# Patient Record
Sex: MALE | Race: WHITE | NOT HISPANIC OR LATINO | Employment: OTHER | URBAN - METROPOLITAN AREA
[De-identification: names, ages, dates, MRNs, and addresses within clinical notes are randomized per-mention and may not be internally consistent; named-entity substitution may affect disease eponyms.]

---

## 2023-01-01 ENCOUNTER — MEDICAL CORRESPONDENCE (OUTPATIENT)
Dept: HEALTH INFORMATION MANAGEMENT | Facility: CLINIC | Age: 78
End: 2023-01-01

## 2023-01-01 ENCOUNTER — APPOINTMENT (OUTPATIENT)
Dept: GENERAL RADIOLOGY | Facility: CLINIC | Age: 78
DRG: 871 | End: 2023-01-01
Attending: EMERGENCY MEDICINE
Payer: MEDICARE

## 2023-01-01 ENCOUNTER — LAB REQUISITION (OUTPATIENT)
Dept: LAB | Facility: CLINIC | Age: 78
End: 2023-01-01
Payer: MEDICARE

## 2023-01-01 ENCOUNTER — HOSPITAL ENCOUNTER (INPATIENT)
Facility: CLINIC | Age: 78
LOS: 2 days | DRG: 871 | End: 2023-12-05
Attending: EMERGENCY MEDICINE | Admitting: HOSPITALIST
Payer: MEDICARE

## 2023-01-01 ENCOUNTER — APPOINTMENT (OUTPATIENT)
Dept: GENERAL RADIOLOGY | Facility: CLINIC | Age: 78
End: 2023-01-01
Payer: MEDICARE

## 2023-01-01 ENCOUNTER — DOCUMENTATION ONLY (OUTPATIENT)
Dept: OTHER | Facility: CLINIC | Age: 78
End: 2023-01-01
Payer: MEDICARE

## 2023-01-01 ENCOUNTER — HOSPITAL ENCOUNTER (OUTPATIENT)
Facility: CLINIC | Age: 78
Setting detail: OBSERVATION
Discharge: HOME | End: 2023-11-30
Attending: EMERGENCY MEDICINE | Admitting: HOSPITALIST
Payer: MEDICARE

## 2023-01-01 ENCOUNTER — DOCUMENTATION ONLY (OUTPATIENT)
Dept: CARE COORDINATION | Facility: CLINIC | Age: 78
End: 2023-01-01
Payer: MEDICARE

## 2023-01-01 ENCOUNTER — APPOINTMENT (OUTPATIENT)
Dept: CT IMAGING | Facility: CLINIC | Age: 78
End: 2023-01-01
Payer: MEDICARE

## 2023-01-01 ENCOUNTER — TRANSFERRED RECORDS (OUTPATIENT)
Dept: HEALTH INFORMATION MANAGEMENT | Facility: CLINIC | Age: 78
End: 2023-01-01

## 2023-01-01 ENCOUNTER — APPOINTMENT (OUTPATIENT)
Dept: CT IMAGING | Facility: CLINIC | Age: 78
DRG: 871 | End: 2023-01-01
Attending: EMERGENCY MEDICINE
Payer: MEDICARE

## 2023-01-01 ENCOUNTER — APPOINTMENT (OUTPATIENT)
Dept: CARDIOLOGY | Facility: CLINIC | Age: 78
DRG: 871 | End: 2023-01-01
Attending: PHYSICIAN ASSISTANT
Payer: MEDICARE

## 2023-01-01 VITALS
HEART RATE: 81 BPM | RESPIRATION RATE: 21 BRPM | BODY MASS INDEX: 25.77 KG/M2 | DIASTOLIC BLOOD PRESSURE: 91 MMHG | OXYGEN SATURATION: 97 % | SYSTOLIC BLOOD PRESSURE: 115 MMHG | TEMPERATURE: 97.8 F | WEIGHT: 180 LBS | HEIGHT: 70 IN

## 2023-01-01 VITALS
HEART RATE: 92 BPM | HEIGHT: 70 IN | TEMPERATURE: 97.5 F | SYSTOLIC BLOOD PRESSURE: 115 MMHG | OXYGEN SATURATION: 98 % | BODY MASS INDEX: 25.77 KG/M2 | RESPIRATION RATE: 22 BRPM | WEIGHT: 180 LBS | DIASTOLIC BLOOD PRESSURE: 58 MMHG

## 2023-01-01 DIAGNOSIS — K59.00 CONSTIPATION, UNSPECIFIED CONSTIPATION TYPE: ICD-10-CM

## 2023-01-01 DIAGNOSIS — R79.89 ELEVATED TROPONIN: ICD-10-CM

## 2023-01-01 DIAGNOSIS — R53.1 GENERALIZED WEAKNESS: Primary | ICD-10-CM

## 2023-01-01 DIAGNOSIS — N39.0 URINARY TRACT INFECTION WITHOUT HEMATURIA, SITE UNSPECIFIED: ICD-10-CM

## 2023-01-01 DIAGNOSIS — R19.5 GUAIAC POSITIVE STOOLS: ICD-10-CM

## 2023-01-01 DIAGNOSIS — D64.9 ANEMIA: ICD-10-CM

## 2023-01-01 DIAGNOSIS — A41.9 SEPSIS, DUE TO UNSPECIFIED ORGANISM, UNSPECIFIED WHETHER ACUTE ORGAN DYSFUNCTION PRESENT (H): ICD-10-CM

## 2023-01-01 DIAGNOSIS — R35.0 FREQUENCY OF MICTURITION: ICD-10-CM

## 2023-01-01 DIAGNOSIS — D64.9 ANEMIA, UNSPECIFIED: ICD-10-CM

## 2023-01-01 LAB
ABO/RH(D): NORMAL
ALBUMIN SERPL BCG-MCNC: 2.4 G/DL (ref 3.5–5.2)
ALBUMIN SERPL BCG-MCNC: 2.4 G/DL (ref 3.5–5.2)
ALBUMIN SERPL BCG-MCNC: 2.5 G/DL (ref 3.5–5.2)
ALBUMIN SERPL BCG-MCNC: 3.2 G/DL (ref 3.5–5.2)
ALBUMIN SERPL BCG-MCNC: 3.3 G/DL (ref 3.5–5.2)
ALBUMIN UR-MCNC: 100 MG/DL
ALBUMIN UR-MCNC: 70 MG/DL
ALBUMIN UR-MCNC: 70 MG/DL
ALLEN'S TEST: ABNORMAL
ALLEN'S TEST: YES
ALP SERPL-CCNC: 64 U/L (ref 40–150)
ALP SERPL-CCNC: 69 U/L (ref 40–150)
ALP SERPL-CCNC: 83 U/L (ref 40–150)
ALP SERPL-CCNC: 89 U/L (ref 40–150)
ALT SERPL W P-5'-P-CCNC: 1133 U/L (ref 0–70)
ALT SERPL W P-5'-P-CCNC: 22 U/L (ref 0–70)
ALT SERPL W P-5'-P-CCNC: 29 U/L (ref 0–70)
ALT SERPL W P-5'-P-CCNC: 39 U/L (ref 0–70)
ANION GAP SERPL CALCULATED.3IONS-SCNC: 11 MMOL/L (ref 7–15)
ANION GAP SERPL CALCULATED.3IONS-SCNC: 12 MMOL/L (ref 7–15)
ANION GAP SERPL CALCULATED.3IONS-SCNC: 13 MMOL/L (ref 7–15)
ANION GAP SERPL CALCULATED.3IONS-SCNC: 14 MMOL/L (ref 7–15)
ANION GAP SERPL CALCULATED.3IONS-SCNC: 23 MMOL/L (ref 7–15)
ANION GAP SERPL CALCULATED.3IONS-SCNC: 25 MMOL/L (ref 7–15)
ANION GAP SERPL CALCULATED.3IONS-SCNC: 8 MMOL/L (ref 7–15)
ANTIBODY SCREEN: NEGATIVE
APPEARANCE UR: ABNORMAL
APPEARANCE UR: ABNORMAL
APPEARANCE UR: CLEAR
AST SERPL W P-5'-P-CCNC: 20 U/L (ref 0–45)
AST SERPL W P-5'-P-CCNC: 21 U/L (ref 0–45)
AST SERPL W P-5'-P-CCNC: 25 U/L (ref 0–45)
AST SERPL W P-5'-P-CCNC: 2581 U/L (ref 0–45)
ATRIAL RATE - MUSE: 119 BPM
ATRIAL RATE - MUSE: 70 BPM
BACTERIA #/AREA URNS HPF: ABNORMAL /HPF
BACTERIA BLD CULT: ABNORMAL
BACTERIA BLD CULT: ABNORMAL
BACTERIA BLD CULT: NO GROWTH
BACTERIA UR CULT: NORMAL
BASE EXCESS BLDA CALC-SCNC: -12 MMOL/L (ref -9–1.8)
BASE EXCESS BLDA CALC-SCNC: -15.4 MMOL/L (ref -9–1.8)
BASE EXCESS BLDA CALC-SCNC: -17.2 MMOL/L (ref -9–1.8)
BASE EXCESS BLDA CALC-SCNC: -4.5 MMOL/L (ref -9–1.8)
BASE EXCESS BLDA CALC-SCNC: -7.5 MMOL/L (ref -9–1.8)
BASE EXCESS BLDV CALC-SCNC: -8.4 MMOL/L (ref -7.7–1.9)
BASOPHILS # BLD AUTO: 0 10E3/UL (ref 0–0.2)
BASOPHILS # BLD AUTO: 0 10E3/UL (ref 0–0.2)
BASOPHILS NFR BLD AUTO: 0 %
BASOPHILS NFR BLD AUTO: 0 %
BILIRUB DIRECT SERPL-MCNC: 0.53 MG/DL (ref 0–0.3)
BILIRUB DIRECT SERPL-MCNC: <0.2 MG/DL (ref 0–0.3)
BILIRUB SERPL-MCNC: 0.4 MG/DL
BILIRUB SERPL-MCNC: 0.4 MG/DL
BILIRUB SERPL-MCNC: 0.5 MG/DL
BILIRUB SERPL-MCNC: 0.7 MG/DL
BILIRUB UR QL STRIP: NEGATIVE
BUN SERPL-MCNC: 20.6 MG/DL (ref 8–23)
BUN SERPL-MCNC: 25.9 MG/DL (ref 8–23)
BUN SERPL-MCNC: 26.3 MG/DL (ref 8–23)
BUN SERPL-MCNC: 26.6 MG/DL (ref 8–23)
BUN SERPL-MCNC: 30 MG/DL (ref 8–23)
BUN SERPL-MCNC: 42.4 MG/DL (ref 8–23)
BUN SERPL-MCNC: 44.2 MG/DL (ref 8–23)
CA-I BLD-MCNC: 4.3 MG/DL (ref 4.4–5.2)
CALCIUM SERPL-MCNC: 7.5 MG/DL (ref 8.8–10.2)
CALCIUM SERPL-MCNC: 7.7 MG/DL (ref 8.8–10.2)
CALCIUM SERPL-MCNC: 7.9 MG/DL (ref 8.8–10.2)
CALCIUM SERPL-MCNC: 8.5 MG/DL (ref 8.8–10.2)
CALCIUM SERPL-MCNC: 8.6 MG/DL (ref 8.8–10.2)
CALCIUM SERPL-MCNC: 9 MG/DL (ref 8.8–10.2)
CALCIUM SERPL-MCNC: 9.8 MG/DL (ref 8.8–10.2)
CAOX CRY #/AREA URNS HPF: ABNORMAL /HPF
CHLORIDE SERPL-SCNC: 104 MMOL/L (ref 98–107)
CHLORIDE SERPL-SCNC: 105 MMOL/L (ref 98–107)
CHLORIDE SERPL-SCNC: 109 MMOL/L (ref 98–107)
CHLORIDE SERPL-SCNC: 110 MMOL/L (ref 98–107)
CHLORIDE SERPL-SCNC: 112 MMOL/L (ref 98–107)
CHLORIDE SERPL-SCNC: 115 MMOL/L (ref 98–107)
CHLORIDE SERPL-SCNC: 116 MMOL/L (ref 98–107)
CK SERPL-CCNC: 140 U/L (ref 39–308)
COLOR UR AUTO: ABNORMAL
COLOR UR AUTO: ABNORMAL
COLOR UR AUTO: YELLOW
CREAT SERPL-MCNC: 0.81 MG/DL (ref 0.67–1.17)
CREAT SERPL-MCNC: 0.83 MG/DL (ref 0.67–1.17)
CREAT SERPL-MCNC: 1.04 MG/DL (ref 0.67–1.17)
CREAT SERPL-MCNC: 1.38 MG/DL (ref 0.67–1.17)
CREAT SERPL-MCNC: 1.59 MG/DL (ref 0.67–1.17)
CREAT SERPL-MCNC: 2.21 MG/DL (ref 0.67–1.17)
CREAT SERPL-MCNC: 2.23 MG/DL (ref 0.67–1.17)
DEPRECATED HCO3 PLAS-SCNC: 16 MMOL/L (ref 22–29)
DEPRECATED HCO3 PLAS-SCNC: 17 MMOL/L (ref 22–29)
DEPRECATED HCO3 PLAS-SCNC: 24 MMOL/L (ref 22–29)
DEPRECATED HCO3 PLAS-SCNC: 7 MMOL/L (ref 22–29)
DEPRECATED HCO3 PLAS-SCNC: 9 MMOL/L (ref 22–29)
DIASTOLIC BLOOD PRESSURE - MUSE: NORMAL MMHG
DIASTOLIC BLOOD PRESSURE - MUSE: NORMAL MMHG
EGFRCR SERPLBLD CKD-EPI 2021: 30 ML/MIN/1.73M2
EGFRCR SERPLBLD CKD-EPI 2021: 30 ML/MIN/1.73M2
EGFRCR SERPLBLD CKD-EPI 2021: 44 ML/MIN/1.73M2
EGFRCR SERPLBLD CKD-EPI 2021: 53 ML/MIN/1.73M2
EGFRCR SERPLBLD CKD-EPI 2021: 74 ML/MIN/1.73M2
EGFRCR SERPLBLD CKD-EPI 2021: 90 ML/MIN/1.73M2
EGFRCR SERPLBLD CKD-EPI 2021: >90 ML/MIN/1.73M2
ENTEROCOCCUS FAECALIS: NOT DETECTED
ENTEROCOCCUS FAECALIS: NOT DETECTED
ENTEROCOCCUS FAECIUM: NOT DETECTED
ENTEROCOCCUS FAECIUM: NOT DETECTED
EOSINOPHIL # BLD AUTO: 0 10E3/UL (ref 0–0.7)
EOSINOPHIL # BLD AUTO: 0 10E3/UL (ref 0–0.7)
EOSINOPHIL NFR BLD AUTO: 0 %
EOSINOPHIL NFR BLD AUTO: 0 %
ERYTHROCYTE [DISTWIDTH] IN BLOOD BY AUTOMATED COUNT: 14.3 % (ref 10–15)
ERYTHROCYTE [DISTWIDTH] IN BLOOD BY AUTOMATED COUNT: 14.5 % (ref 10–15)
ERYTHROCYTE [DISTWIDTH] IN BLOOD BY AUTOMATED COUNT: 14.6 % (ref 10–15)
ERYTHROCYTE [DISTWIDTH] IN BLOOD BY AUTOMATED COUNT: 14.7 % (ref 10–15)
ERYTHROCYTE [DISTWIDTH] IN BLOOD BY AUTOMATED COUNT: 15.1 % (ref 10–15)
ERYTHROCYTE [DISTWIDTH] IN BLOOD BY AUTOMATED COUNT: 15.4 % (ref 10–15)
FLUAV RNA SPEC QL NAA+PROBE: NEGATIVE
FLUAV RNA SPEC QL NAA+PROBE: NEGATIVE
FLUBV RNA RESP QL NAA+PROBE: NEGATIVE
FLUBV RNA RESP QL NAA+PROBE: NEGATIVE
GLUCOSE BLDC GLUCOMTR-MCNC: 102 MG/DL (ref 70–99)
GLUCOSE BLDC GLUCOMTR-MCNC: 106 MG/DL (ref 70–99)
GLUCOSE BLDC GLUCOMTR-MCNC: 111 MG/DL (ref 70–99)
GLUCOSE BLDC GLUCOMTR-MCNC: 113 MG/DL (ref 70–99)
GLUCOSE BLDC GLUCOMTR-MCNC: 124 MG/DL (ref 70–99)
GLUCOSE BLDC GLUCOMTR-MCNC: 127 MG/DL (ref 70–99)
GLUCOSE BLDC GLUCOMTR-MCNC: 128 MG/DL (ref 70–99)
GLUCOSE BLDC GLUCOMTR-MCNC: 141 MG/DL (ref 70–99)
GLUCOSE BLDC GLUCOMTR-MCNC: 171 MG/DL (ref 70–99)
GLUCOSE BLDC GLUCOMTR-MCNC: 176 MG/DL (ref 70–99)
GLUCOSE BLDC GLUCOMTR-MCNC: 213 MG/DL (ref 70–99)
GLUCOSE SERPL-MCNC: 102 MG/DL (ref 70–99)
GLUCOSE SERPL-MCNC: 103 MG/DL (ref 70–99)
GLUCOSE SERPL-MCNC: 112 MG/DL (ref 70–99)
GLUCOSE SERPL-MCNC: 154 MG/DL (ref 70–99)
GLUCOSE SERPL-MCNC: 183 MG/DL (ref 70–99)
GLUCOSE SERPL-MCNC: 209 MG/DL (ref 70–99)
GLUCOSE SERPL-MCNC: 91 MG/DL (ref 70–99)
GLUCOSE UR STRIP-MCNC: 150 MG/DL
GLUCOSE UR STRIP-MCNC: NEGATIVE MG/DL
GLUCOSE UR STRIP-MCNC: NEGATIVE MG/DL
GRANULAR CAST: 3 /LPF
HBA1C MFR BLD: 6.6 %
HCO3 BLD-SCNC: 11 MMOL/L (ref 21–28)
HCO3 BLD-SCNC: 14 MMOL/L (ref 21–28)
HCO3 BLD-SCNC: 17 MMOL/L (ref 21–28)
HCO3 BLD-SCNC: 20 MMOL/L (ref 21–28)
HCO3 BLD-SCNC: 9 MMOL/L (ref 21–28)
HCO3 BLDV-SCNC: 19 MMOL/L (ref 21–28)
HCT VFR BLD AUTO: 21.6 % (ref 40–53)
HCT VFR BLD AUTO: 25.2 % (ref 40–53)
HCT VFR BLD AUTO: 25.7 % (ref 40–53)
HCT VFR BLD AUTO: 29.9 % (ref 40–53)
HCT VFR BLD AUTO: 30.6 % (ref 40–53)
HCT VFR BLD AUTO: 30.6 % (ref 40–53)
HEMOCCULT STL QL: POSITIVE
HGB BLD-MCNC: 6.5 G/DL (ref 13.3–17.7)
HGB BLD-MCNC: 6.5 G/DL (ref 13.3–17.7)
HGB BLD-MCNC: 7.8 G/DL (ref 13.3–17.7)
HGB BLD-MCNC: 7.8 G/DL (ref 13.3–17.7)
HGB BLD-MCNC: 9.3 G/DL (ref 13.3–17.7)
HGB BLD-MCNC: 9.4 G/DL (ref 13.3–17.7)
HGB BLD-MCNC: 9.6 G/DL (ref 13.3–17.7)
HGB BLD-MCNC: 9.8 G/DL (ref 13.3–17.7)
HGB UR QL STRIP: ABNORMAL
HOLD SPECIMEN: NORMAL
IMM GRANULOCYTES # BLD: 0.1 10E3/UL
IMM GRANULOCYTES # BLD: 0.1 10E3/UL
IMM GRANULOCYTES NFR BLD: 1 %
IMM GRANULOCYTES NFR BLD: 1 %
INR PPP: 2.01 (ref 0.85–1.15)
INTERPRETATION ECG - MUSE: NORMAL
INTERPRETATION ECG - MUSE: NORMAL
KETONES UR STRIP-MCNC: NEGATIVE MG/DL
LACTATE SERPL-SCNC: 1.4 MMOL/L (ref 0.7–2)
LACTATE SERPL-SCNC: 1.6 MMOL/L (ref 0.7–2)
LACTATE SERPL-SCNC: 1.8 MMOL/L (ref 0.7–2)
LACTATE SERPL-SCNC: 2.5 MMOL/L (ref 0.7–2)
LACTATE SERPL-SCNC: 2.9 MMOL/L (ref 0.7–2)
LACTATE SERPL-SCNC: 3.9 MMOL/L (ref 0.7–2)
LACTATE SERPL-SCNC: 4.2 MMOL/L (ref 0.7–2)
LEUKOCYTE ESTERASE UR QL STRIP: ABNORMAL
LEUKOCYTE ESTERASE UR QL STRIP: NEGATIVE
LEUKOCYTE ESTERASE UR QL STRIP: NEGATIVE
LISTERIA SPECIES (DETECTED/NOT DETECTED): NOT DETECTED
LISTERIA SPECIES (DETECTED/NOT DETECTED): NOT DETECTED
LVEF ECHO: NORMAL
LYMPHOCYTES # BLD AUTO: 0.3 10E3/UL (ref 0.8–5.3)
LYMPHOCYTES # BLD AUTO: 0.7 10E3/UL (ref 0.8–5.3)
LYMPHOCYTES NFR BLD AUTO: 2 %
LYMPHOCYTES NFR BLD AUTO: 6 %
MAGNESIUM SERPL-MCNC: 1.8 MG/DL (ref 1.7–2.3)
MAGNESIUM SERPL-MCNC: 1.9 MG/DL (ref 1.7–2.3)
MAGNESIUM SERPL-MCNC: 1.9 MG/DL (ref 1.7–2.3)
MCH RBC QN AUTO: 30.8 PG (ref 26.5–33)
MCH RBC QN AUTO: 31 PG (ref 26.5–33)
MCH RBC QN AUTO: 31.1 PG (ref 26.5–33)
MCH RBC QN AUTO: 31.3 PG (ref 26.5–33)
MCH RBC QN AUTO: 31.3 PG (ref 26.5–33)
MCH RBC QN AUTO: 31.6 PG (ref 26.5–33)
MCHC RBC AUTO-ENTMCNC: 30.1 G/DL (ref 31.5–36.5)
MCHC RBC AUTO-ENTMCNC: 30.4 G/DL (ref 31.5–36.5)
MCHC RBC AUTO-ENTMCNC: 30.7 G/DL (ref 31.5–36.5)
MCHC RBC AUTO-ENTMCNC: 31 G/DL (ref 31.5–36.5)
MCHC RBC AUTO-ENTMCNC: 31.1 G/DL (ref 31.5–36.5)
MCHC RBC AUTO-ENTMCNC: 31.4 G/DL (ref 31.5–36.5)
MCV RBC AUTO: 100 FL (ref 78–100)
MCV RBC AUTO: 101 FL (ref 78–100)
MCV RBC AUTO: 103 FL (ref 78–100)
MCV RBC AUTO: 104 FL (ref 78–100)
MONOCYTES # BLD AUTO: 0.5 10E3/UL (ref 0–1.3)
MONOCYTES # BLD AUTO: 0.6 10E3/UL (ref 0–1.3)
MONOCYTES NFR BLD AUTO: 4 %
MONOCYTES NFR BLD AUTO: 5 %
MUCOUS THREADS #/AREA URNS LPF: PRESENT /LPF
MUCOUS THREADS #/AREA URNS LPF: PRESENT /LPF
NEUTROPHILS # BLD AUTO: 11.7 10E3/UL (ref 1.6–8.3)
NEUTROPHILS # BLD AUTO: 9.3 10E3/UL (ref 1.6–8.3)
NEUTROPHILS NFR BLD AUTO: 89 %
NEUTROPHILS NFR BLD AUTO: 92 %
NITRATE UR QL: NEGATIVE
NITRATE UR QL: NEGATIVE
NITRATE UR QL: POSITIVE
NRBC # BLD AUTO: 0 10E3/UL
NRBC # BLD AUTO: 0 10E3/UL
NRBC BLD AUTO-RTO: 0 /100
NRBC BLD AUTO-RTO: 0 /100
NT-PROBNP SERPL-MCNC: 796 PG/ML (ref 0–1800)
O2/TOTAL GAS SETTING VFR VENT: 36 %
O2/TOTAL GAS SETTING VFR VENT: 44 %
O2/TOTAL GAS SETTING VFR VENT: 5 %
O2/TOTAL GAS SETTING VFR VENT: 50 %
OXYHGB MFR BLD: 98 % (ref 92–100)
P AXIS - MUSE: -5 DEGREES
P AXIS - MUSE: NORMAL DEGREES
PCO2 BLD: 23 MM HG (ref 35–45)
PCO2 BLD: 24 MM HG (ref 35–45)
PCO2 BLD: 29 MM HG (ref 35–45)
PCO2 BLD: 31 MM HG (ref 35–45)
PCO2 BLD: 32 MM HG (ref 35–45)
PCO2 BLDV: 51 MM HG (ref 40–50)
PH BLD: 7.21 [PH] (ref 7.35–7.45)
PH BLD: 7.25 [PH] (ref 7.35–7.45)
PH BLD: 7.27 [PH] (ref 7.35–7.45)
PH BLD: 7.35 [PH] (ref 7.35–7.45)
PH BLD: 7.4 [PH] (ref 7.35–7.45)
PH BLDV: 7.19 [PH] (ref 7.32–7.43)
PH UR STRIP: 5.5 [PH] (ref 5–7)
PH UR STRIP: 6 [PH] (ref 5–7)
PH UR STRIP: 6.5 [PH] (ref 5–7)
PHOSPHATE SERPL-MCNC: 2.4 MG/DL (ref 2.5–4.5)
PHOSPHATE SERPL-MCNC: 2.6 MG/DL (ref 2.5–4.5)
PHOSPHATE SERPL-MCNC: 6.8 MG/DL (ref 2.5–4.5)
PHOSPHATE SERPL-MCNC: 7.1 MG/DL (ref 2.5–4.5)
PLATELET # BLD AUTO: 146 10E3/UL (ref 150–450)
PLATELET # BLD AUTO: 147 10E3/UL (ref 150–450)
PLATELET # BLD AUTO: 178 10E3/UL (ref 150–450)
PLATELET # BLD AUTO: 186 10E3/UL (ref 150–450)
PLATELET # BLD AUTO: 186 10E3/UL (ref 150–450)
PLATELET # BLD AUTO: 188 10E3/UL (ref 150–450)
PO2 BLD: 115 MM HG (ref 80–105)
PO2 BLD: 137 MM HG (ref 80–105)
PO2 BLD: 163 MM HG (ref 80–105)
PO2 BLD: 175 MM HG (ref 80–105)
PO2 BLD: 79 MM HG (ref 80–105)
PO2 BLDV: 24 MM HG (ref 25–47)
POTASSIUM SERPL-SCNC: 2.8 MMOL/L (ref 3.4–5.3)
POTASSIUM SERPL-SCNC: 3.1 MMOL/L (ref 3.4–5.3)
POTASSIUM SERPL-SCNC: 3.8 MMOL/L (ref 3.4–5.3)
POTASSIUM SERPL-SCNC: 3.9 MMOL/L (ref 3.4–5.3)
POTASSIUM SERPL-SCNC: 4.2 MMOL/L (ref 3.4–5.3)
POTASSIUM SERPL-SCNC: 5.2 MMOL/L (ref 3.4–5.3)
POTASSIUM SERPL-SCNC: 5.6 MMOL/L (ref 3.4–5.3)
POTASSIUM SERPL-SCNC: 6 MMOL/L (ref 3.4–5.3)
PR INTERVAL - MUSE: 178 MS
PR INTERVAL - MUSE: NORMAL MS
PROCALCITONIN SERPL IA-MCNC: 17.52 NG/ML
PROT SERPL-MCNC: 5 G/DL (ref 6.4–8.3)
PROT SERPL-MCNC: 5.2 G/DL (ref 6.4–8.3)
PROT SERPL-MCNC: 5.7 G/DL (ref 6.4–8.3)
PROT SERPL-MCNC: 6.4 G/DL (ref 6.4–8.3)
QRS DURATION - MUSE: 124 MS
QRS DURATION - MUSE: 96 MS
QT - MUSE: 440 MS
QT - MUSE: 450 MS
QTC - MUSE: 486 MS
QTC - MUSE: 523 MS
R AXIS - MUSE: -22 DEGREES
R AXIS - MUSE: -29 DEGREES
RBC # BLD AUTO: 2.1 10E6/UL (ref 4.4–5.9)
RBC # BLD AUTO: 2.47 10E6/UL (ref 4.4–5.9)
RBC # BLD AUTO: 2.51 10E6/UL (ref 4.4–5.9)
RBC # BLD AUTO: 2.97 10E6/UL (ref 4.4–5.9)
RBC # BLD AUTO: 3.05 10E6/UL (ref 4.4–5.9)
RBC # BLD AUTO: 3.07 10E6/UL (ref 4.4–5.9)
RBC URINE: 1 /HPF
RBC URINE: 10 /HPF
RBC URINE: 16 /HPF
RSV RNA SPEC NAA+PROBE: NEGATIVE
RSV RNA SPEC NAA+PROBE: NEGATIVE
SARS-COV-2 RNA RESP QL NAA+PROBE: NEGATIVE
SARS-COV-2 RNA RESP QL NAA+PROBE: NEGATIVE
SODIUM SERPL-SCNC: 140 MMOL/L (ref 135–145)
SODIUM SERPL-SCNC: 140 MMOL/L (ref 135–145)
SODIUM SERPL-SCNC: 141 MMOL/L (ref 135–145)
SODIUM SERPL-SCNC: 143 MMOL/L (ref 135–145)
SODIUM SERPL-SCNC: 144 MMOL/L (ref 135–145)
SODIUM SERPL-SCNC: 144 MMOL/L (ref 135–145)
SODIUM SERPL-SCNC: 146 MMOL/L (ref 135–145)
SP GR UR STRIP: 1.01 (ref 1–1.03)
SP GR UR STRIP: 1.02 (ref 1–1.03)
SP GR UR STRIP: 1.02 (ref 1–1.03)
SPECIMEN EXPIRATION DATE: NORMAL
STAPHYLOCOCCUS AUREUS: NOT DETECTED
STAPHYLOCOCCUS AUREUS: NOT DETECTED
STAPHYLOCOCCUS EPIDERMIDIS: NOT DETECTED
STAPHYLOCOCCUS EPIDERMIDIS: NOT DETECTED
STAPHYLOCOCCUS LUGDUNENSIS: NOT DETECTED
STAPHYLOCOCCUS LUGDUNENSIS: NOT DETECTED
STAPHYLOCOCCUS SPECIES: DETECTED
STAPHYLOCOCCUS SPECIES: NOT DETECTED
STREPTOCOCCUS AGALACTIAE: NOT DETECTED
STREPTOCOCCUS AGALACTIAE: NOT DETECTED
STREPTOCOCCUS ANGINOSUS GROUP: NOT DETECTED
STREPTOCOCCUS ANGINOSUS GROUP: NOT DETECTED
STREPTOCOCCUS PNEUMONIAE: NOT DETECTED
STREPTOCOCCUS PNEUMONIAE: NOT DETECTED
STREPTOCOCCUS PYOGENES: NOT DETECTED
STREPTOCOCCUS PYOGENES: NOT DETECTED
STREPTOCOCCUS SPECIES: DETECTED
STREPTOCOCCUS SPECIES: NOT DETECTED
SYSTOLIC BLOOD PRESSURE - MUSE: NORMAL MMHG
SYSTOLIC BLOOD PRESSURE - MUSE: NORMAL MMHG
T AXIS - MUSE: 56 DEGREES
T AXIS - MUSE: 94 DEGREES
TROPONIN T SERPL HS-MCNC: 150 NG/L
TROPONIN T SERPL HS-MCNC: 191 NG/L
TROPONIN T SERPL HS-MCNC: 202 NG/L
TROPONIN T SERPL HS-MCNC: 261 NG/L
UROBILINOGEN UR STRIP-MCNC: NORMAL MG/DL
VANCOMYCIN SERPL-MCNC: 24.1 UG/ML
VANCOMYCIN SERPL-MCNC: 25.7 UG/ML
VENTRICULAR RATE- MUSE: 70 BPM
VENTRICULAR RATE- MUSE: 85 BPM
WBC # BLD AUTO: 10.5 10E3/UL (ref 4–11)
WBC # BLD AUTO: 12 10E3/UL (ref 4–11)
WBC # BLD AUTO: 12.4 10E3/UL (ref 4–11)
WBC # BLD AUTO: 12.8 10E3/UL (ref 4–11)
WBC # BLD AUTO: 9.2 10E3/UL (ref 4–11)
WBC # BLD AUTO: 9.5 10E3/UL (ref 4–11)
WBC URINE: 1 /HPF
WBC URINE: 55 /HPF
WBC URINE: <1 /HPF

## 2023-01-01 PROCEDURE — 87086 URINE CULTURE/COLONY COUNT: CPT | Mod: ORL

## 2023-01-01 PROCEDURE — 84450 TRANSFERASE (AST) (SGOT): CPT | Performed by: INTERNAL MEDICINE

## 2023-01-01 PROCEDURE — 83735 ASSAY OF MAGNESIUM: CPT | Performed by: PHYSICIAN ASSISTANT

## 2023-01-01 PROCEDURE — 71046 X-RAY EXAM CHEST 2 VIEWS: CPT

## 2023-01-01 PROCEDURE — 99207 PR NO BILLABLE SERVICE THIS VISIT: CPT | Performed by: INTERNAL MEDICINE

## 2023-01-01 PROCEDURE — 258N000003 HC RX IP 258 OP 636

## 2023-01-01 PROCEDURE — 999N000157 HC STATISTIC RCP TIME EA 10 MIN

## 2023-01-01 PROCEDURE — 36600 WITHDRAWAL OF ARTERIAL BLOOD: CPT

## 2023-01-01 PROCEDURE — 80202 ASSAY OF VANCOMYCIN: CPT | Performed by: HOSPITALIST

## 2023-01-01 PROCEDURE — 81001 URINALYSIS AUTO W/SCOPE: CPT | Mod: ORL

## 2023-01-01 PROCEDURE — 83735 ASSAY OF MAGNESIUM: CPT | Performed by: HOSPITALIST

## 2023-01-01 PROCEDURE — 250N000009 HC RX 250: Performed by: INTERNAL MEDICINE

## 2023-01-01 PROCEDURE — G0378 HOSPITAL OBSERVATION PER HR: HCPCS

## 2023-01-01 PROCEDURE — 99233 SBSQ HOSP IP/OBS HIGH 50: CPT | Mod: 25 | Performed by: SURGERY

## 2023-01-01 PROCEDURE — 85610 PROTHROMBIN TIME: CPT | Performed by: SURGERY

## 2023-01-01 PROCEDURE — 84132 ASSAY OF SERUM POTASSIUM: CPT | Performed by: INTERNAL MEDICINE

## 2023-01-01 PROCEDURE — 36415 COLL VENOUS BLD VENIPUNCTURE: CPT | Performed by: EMERGENCY MEDICINE

## 2023-01-01 PROCEDURE — 82248 BILIRUBIN DIRECT: CPT | Performed by: STUDENT IN AN ORGANIZED HEALTH CARE EDUCATION/TRAINING PROGRAM

## 2023-01-01 PROCEDURE — 82803 BLOOD GASES ANY COMBINATION: CPT | Performed by: INTERNAL MEDICINE

## 2023-01-01 PROCEDURE — 84100 ASSAY OF PHOSPHORUS: CPT | Performed by: SURGERY

## 2023-01-01 PROCEDURE — 99291 CRITICAL CARE FIRST HOUR: CPT

## 2023-01-01 PROCEDURE — 250N000013 HC RX MED GY IP 250 OP 250 PS 637: Performed by: STUDENT IN AN ORGANIZED HEALTH CARE EDUCATION/TRAINING PROGRAM

## 2023-01-01 PROCEDURE — 84155 ASSAY OF PROTEIN SERUM: CPT | Performed by: INTERNAL MEDICINE

## 2023-01-01 PROCEDURE — 83880 ASSAY OF NATRIURETIC PEPTIDE: CPT

## 2023-01-01 PROCEDURE — 250N000011 HC RX IP 250 OP 636: Mod: JZ | Performed by: EMERGENCY MEDICINE

## 2023-01-01 PROCEDURE — 250N000009 HC RX 250: Performed by: EMERGENCY MEDICINE

## 2023-01-01 PROCEDURE — 83036 HEMOGLOBIN GLYCOSYLATED A1C: CPT | Performed by: PHYSICIAN ASSISTANT

## 2023-01-01 PROCEDURE — 85027 COMPLETE CBC AUTOMATED: CPT | Performed by: SURGERY

## 2023-01-01 PROCEDURE — 99291 CRITICAL CARE FIRST HOUR: CPT | Performed by: INTERNAL MEDICINE

## 2023-01-01 PROCEDURE — 36415 COLL VENOUS BLD VENIPUNCTURE: CPT | Performed by: STUDENT IN AN ORGANIZED HEALTH CARE EDUCATION/TRAINING PROGRAM

## 2023-01-01 PROCEDURE — 83735 ASSAY OF MAGNESIUM: CPT | Performed by: INTERNAL MEDICINE

## 2023-01-01 PROCEDURE — 82803 BLOOD GASES ANY COMBINATION: CPT

## 2023-01-01 PROCEDURE — 250N000013 HC RX MED GY IP 250 OP 250 PS 637: Performed by: PHYSICIAN ASSISTANT

## 2023-01-01 PROCEDURE — 86901 BLOOD TYPING SEROLOGIC RH(D): CPT

## 2023-01-01 PROCEDURE — 83605 ASSAY OF LACTIC ACID: CPT

## 2023-01-01 PROCEDURE — 258N000003 HC RX IP 258 OP 636: Performed by: EMERGENCY MEDICINE

## 2023-01-01 PROCEDURE — 85027 COMPLETE CBC AUTOMATED: CPT | Performed by: PHYSICIAN ASSISTANT

## 2023-01-01 PROCEDURE — 5A09357 ASSISTANCE WITH RESPIRATORY VENTILATION, LESS THAN 24 CONSECUTIVE HOURS, CONTINUOUS POSITIVE AIRWAY PRESSURE: ICD-10-PCS | Performed by: INTERNAL MEDICINE

## 2023-01-01 PROCEDURE — 99222 1ST HOSP IP/OBS MODERATE 55: CPT | Mod: AI | Performed by: PHYSICIAN ASSISTANT

## 2023-01-01 PROCEDURE — 36415 COLL VENOUS BLD VENIPUNCTURE: CPT | Performed by: PHYSICIAN ASSISTANT

## 2023-01-01 PROCEDURE — 250N000013 HC RX MED GY IP 250 OP 250 PS 637: Performed by: INTERNAL MEDICINE

## 2023-01-01 PROCEDURE — 82803 BLOOD GASES ANY COMBINATION: CPT | Performed by: PEDIATRICS

## 2023-01-01 PROCEDURE — 87077 CULTURE AEROBIC IDENTIFY: CPT

## 2023-01-01 PROCEDURE — 87637 SARSCOV2&INF A&B&RSV AMP PRB: CPT | Performed by: EMERGENCY MEDICINE

## 2023-01-01 PROCEDURE — 258N000003 HC RX IP 258 OP 636: Performed by: PHYSICIAN ASSISTANT

## 2023-01-01 PROCEDURE — 87040 BLOOD CULTURE FOR BACTERIA: CPT

## 2023-01-01 PROCEDURE — 250N000011 HC RX IP 250 OP 636: Performed by: INTERNAL MEDICINE

## 2023-01-01 PROCEDURE — 36620 INSERTION CATHETER ARTERY: CPT | Performed by: SURGERY

## 2023-01-01 PROCEDURE — 93005 ELECTROCARDIOGRAM TRACING: CPT

## 2023-01-01 PROCEDURE — 84075 ASSAY ALKALINE PHOSPHATASE: CPT | Performed by: INTERNAL MEDICINE

## 2023-01-01 PROCEDURE — 84484 ASSAY OF TROPONIN QUANT: CPT | Performed by: EMERGENCY MEDICINE

## 2023-01-01 PROCEDURE — 87186 SC STD MICRODIL/AGAR DIL: CPT | Performed by: EMERGENCY MEDICINE

## 2023-01-01 PROCEDURE — 93010 ELECTROCARDIOGRAM REPORT: CPT | Performed by: INTERNAL MEDICINE

## 2023-01-01 PROCEDURE — 85018 HEMOGLOBIN: CPT | Mod: 91

## 2023-01-01 PROCEDURE — 250N000011 HC RX IP 250 OP 636: Mod: JZ | Performed by: INTERNAL MEDICINE

## 2023-01-01 PROCEDURE — 74177 CT ABD & PELVIS W/CONTRAST: CPT | Mod: MG

## 2023-01-01 PROCEDURE — 258N000001 HC RX 258: Performed by: INTERNAL MEDICINE

## 2023-01-01 PROCEDURE — 96365 THER/PROPH/DIAG IV INF INIT: CPT | Mod: 59

## 2023-01-01 PROCEDURE — 82247 BILIRUBIN TOTAL: CPT | Performed by: INTERNAL MEDICINE

## 2023-01-01 PROCEDURE — 99239 HOSP IP/OBS DSCHRG MGMT >30: CPT | Performed by: STUDENT IN AN ORGANIZED HEALTH CARE EDUCATION/TRAINING PROGRAM

## 2023-01-01 PROCEDURE — 250N000011 HC RX IP 250 OP 636: Mod: JZ | Performed by: PHYSICIAN ASSISTANT

## 2023-01-01 PROCEDURE — 83605 ASSAY OF LACTIC ACID: CPT | Performed by: STUDENT IN AN ORGANIZED HEALTH CARE EDUCATION/TRAINING PROGRAM

## 2023-01-01 PROCEDURE — 210N000002 HC R&B HEART CARE

## 2023-01-01 PROCEDURE — 258N000003 HC RX IP 258 OP 636: Performed by: INTERNAL MEDICINE

## 2023-01-01 PROCEDURE — 250N000011 HC RX IP 250 OP 636: Performed by: EMERGENCY MEDICINE

## 2023-01-01 PROCEDURE — 85004 AUTOMATED DIFF WBC COUNT: CPT | Performed by: STUDENT IN AN ORGANIZED HEALTH CARE EDUCATION/TRAINING PROGRAM

## 2023-01-01 PROCEDURE — 85018 HEMOGLOBIN: CPT | Performed by: INTERNAL MEDICINE

## 2023-01-01 PROCEDURE — 83605 ASSAY OF LACTIC ACID: CPT | Performed by: EMERGENCY MEDICINE

## 2023-01-01 PROCEDURE — 87086 URINE CULTURE/COLONY COUNT: CPT | Performed by: EMERGENCY MEDICINE

## 2023-01-01 PROCEDURE — 36415 COLL VENOUS BLD VENIPUNCTURE: CPT

## 2023-01-01 PROCEDURE — 94660 CPAP INITIATION&MGMT: CPT

## 2023-01-01 PROCEDURE — 87149 DNA/RNA DIRECT PROBE: CPT | Mod: XU

## 2023-01-01 PROCEDURE — 87040 BLOOD CULTURE FOR BACTERIA: CPT | Performed by: EMERGENCY MEDICINE

## 2023-01-01 PROCEDURE — 86850 RBC ANTIBODY SCREEN: CPT

## 2023-01-01 PROCEDURE — 82330 ASSAY OF CALCIUM: CPT | Performed by: INTERNAL MEDICINE

## 2023-01-01 PROCEDURE — 250N000011 HC RX IP 250 OP 636

## 2023-01-01 PROCEDURE — 81001 URINALYSIS AUTO W/SCOPE: CPT

## 2023-01-01 PROCEDURE — 82248 BILIRUBIN DIRECT: CPT | Performed by: INTERNAL MEDICINE

## 2023-01-01 PROCEDURE — 84484 ASSAY OF TROPONIN QUANT: CPT | Performed by: INTERNAL MEDICINE

## 2023-01-01 PROCEDURE — 81001 URINALYSIS AUTO W/SCOPE: CPT | Performed by: EMERGENCY MEDICINE

## 2023-01-01 PROCEDURE — 96360 HYDRATION IV INFUSION INIT: CPT | Mod: 59

## 2023-01-01 PROCEDURE — 83605 ASSAY OF LACTIC ACID: CPT | Performed by: SURGERY

## 2023-01-01 PROCEDURE — 96361 HYDRATE IV INFUSION ADD-ON: CPT

## 2023-01-01 PROCEDURE — 80048 BASIC METABOLIC PNL TOTAL CA: CPT | Performed by: EMERGENCY MEDICINE

## 2023-01-01 PROCEDURE — 82272 OCCULT BLD FECES 1-3 TESTS: CPT | Performed by: EMERGENCY MEDICINE

## 2023-01-01 PROCEDURE — 83605 ASSAY OF LACTIC ACID: CPT | Performed by: INTERNAL MEDICINE

## 2023-01-01 PROCEDURE — 80069 RENAL FUNCTION PANEL: CPT | Performed by: INTERNAL MEDICINE

## 2023-01-01 PROCEDURE — 250N000009 HC RX 250

## 2023-01-01 PROCEDURE — 82550 ASSAY OF CK (CPK): CPT | Performed by: STUDENT IN AN ORGANIZED HEALTH CARE EDUCATION/TRAINING PROGRAM

## 2023-01-01 PROCEDURE — 250N000013 HC RX MED GY IP 250 OP 250 PS 637: Performed by: EMERGENCY MEDICINE

## 2023-01-01 PROCEDURE — 84484 ASSAY OF TROPONIN QUANT: CPT | Performed by: PHYSICIAN ASSISTANT

## 2023-01-01 PROCEDURE — 250N000011 HC RX IP 250 OP 636: Performed by: PEDIATRICS

## 2023-01-01 PROCEDURE — 250N000012 HC RX MED GY IP 250 OP 636 PS 637: Performed by: INTERNAL MEDICINE

## 2023-01-01 PROCEDURE — 999N000208 ECHOCARDIOGRAM COMPLETE

## 2023-01-01 PROCEDURE — 99285 EMERGENCY DEPT VISIT HI MDM: CPT | Mod: 25

## 2023-01-01 PROCEDURE — 999N000040 HC STATISTIC CONSULT NO CHARGE VASC ACCESS

## 2023-01-01 PROCEDURE — 80053 COMPREHEN METABOLIC PANEL: CPT | Performed by: PHYSICIAN ASSISTANT

## 2023-01-01 PROCEDURE — 200N000001 HC R&B ICU

## 2023-01-01 PROCEDURE — 84145 PROCALCITONIN (PCT): CPT

## 2023-01-01 PROCEDURE — 258N000003 HC RX IP 258 OP 636: Performed by: HOSPITALIST

## 2023-01-01 PROCEDURE — 82805 BLOOD GASES W/O2 SATURATION: CPT | Performed by: SURGERY

## 2023-01-01 PROCEDURE — 93306 TTE W/DOPPLER COMPLETE: CPT | Mod: 26 | Performed by: INTERNAL MEDICINE

## 2023-01-01 PROCEDURE — 255N000002 HC RX 255 OP 636: Performed by: HOSPITALIST

## 2023-01-01 PROCEDURE — 84100 ASSAY OF PHOSPHORUS: CPT | Performed by: INTERNAL MEDICINE

## 2023-01-01 PROCEDURE — 84100 ASSAY OF PHOSPHORUS: CPT | Performed by: HOSPITALIST

## 2023-01-01 PROCEDURE — G1010 CDSM STANSON: HCPCS

## 2023-01-01 PROCEDURE — 250N000012 HC RX MED GY IP 250 OP 636 PS 637: Performed by: PHYSICIAN ASSISTANT

## 2023-01-01 PROCEDURE — 87149 DNA/RNA DIRECT PROBE: CPT | Performed by: EMERGENCY MEDICINE

## 2023-01-01 PROCEDURE — 80053 COMPREHEN METABOLIC PANEL: CPT | Performed by: EMERGENCY MEDICINE

## 2023-01-01 PROCEDURE — 85025 COMPLETE CBC W/AUTO DIFF WBC: CPT | Performed by: EMERGENCY MEDICINE

## 2023-01-01 PROCEDURE — 80053 COMPREHEN METABOLIC PANEL: CPT

## 2023-01-01 PROCEDURE — 250N000011 HC RX IP 250 OP 636: Performed by: HOSPITALIST

## 2023-01-01 PROCEDURE — 99223 1ST HOSP IP/OBS HIGH 75: CPT | Mod: AI | Performed by: PHYSICIAN ASSISTANT

## 2023-01-01 PROCEDURE — 80053 COMPREHEN METABOLIC PANEL: CPT | Performed by: INTERNAL MEDICINE

## 2023-01-01 PROCEDURE — 3E033XZ INTRODUCTION OF VASOPRESSOR INTO PERIPHERAL VEIN, PERCUTANEOUS APPROACH: ICD-10-PCS | Performed by: INTERNAL MEDICINE

## 2023-01-01 RX ORDER — ACETAMINOPHEN 650 MG/1
650 SUPPOSITORY RECTAL EVERY 4 HOURS PRN
Status: DISCONTINUED | OUTPATIENT
Start: 2023-01-01 | End: 2023-01-01 | Stop reason: HOSPADM

## 2023-01-01 RX ORDER — PIPERACILLIN SODIUM, TAZOBACTAM SODIUM 3; .375 G/15ML; G/15ML
3.38 INJECTION, POWDER, LYOPHILIZED, FOR SOLUTION INTRAVENOUS EVERY 6 HOURS
Status: DISCONTINUED | OUTPATIENT
Start: 2023-01-01 | End: 2023-01-01 | Stop reason: HOSPADM

## 2023-01-01 RX ORDER — NALOXONE HYDROCHLORIDE 0.4 MG/ML
0.2 INJECTION, SOLUTION INTRAMUSCULAR; INTRAVENOUS; SUBCUTANEOUS
Status: DISCONTINUED | OUTPATIENT
Start: 2023-01-01 | End: 2023-01-01 | Stop reason: HOSPADM

## 2023-01-01 RX ORDER — METOPROLOL SUCCINATE 100 MG/1
100 TABLET, EXTENDED RELEASE ORAL 2 TIMES DAILY
COMMUNITY

## 2023-01-01 RX ORDER — PIPERACILLIN SODIUM, TAZOBACTAM SODIUM 4; .5 G/20ML; G/20ML
4.5 INJECTION, POWDER, LYOPHILIZED, FOR SOLUTION INTRAVENOUS EVERY 6 HOURS
Status: DISCONTINUED | OUTPATIENT
Start: 2023-01-01 | End: 2023-01-01

## 2023-01-01 RX ORDER — AMOXICILLIN 250 MG
1 CAPSULE ORAL 2 TIMES DAILY
Status: DISCONTINUED | OUTPATIENT
Start: 2023-01-01 | End: 2023-01-01 | Stop reason: HOSPADM

## 2023-01-01 RX ORDER — NICOTINE POLACRILEX 4 MG
15-30 LOZENGE BUCCAL
Status: DISCONTINUED | OUTPATIENT
Start: 2023-01-01 | End: 2023-01-01

## 2023-01-01 RX ORDER — ISOSORBIDE MONONITRATE 30 MG/1
30 TABLET, EXTENDED RELEASE ORAL DAILY
Status: DISCONTINUED | OUTPATIENT
Start: 2023-01-01 | End: 2023-01-01 | Stop reason: HOSPADM

## 2023-01-01 RX ORDER — ONDANSETRON 2 MG/ML
4 INJECTION INTRAMUSCULAR; INTRAVENOUS EVERY 6 HOURS PRN
Status: DISCONTINUED | OUTPATIENT
Start: 2023-01-01 | End: 2023-01-01 | Stop reason: HOSPADM

## 2023-01-01 RX ORDER — LANOLIN ALCOHOL/MO/W.PET/CERES
9 CREAM (GRAM) TOPICAL
COMMUNITY

## 2023-01-01 RX ORDER — DEXTROSE MONOHYDRATE 25 G/50ML
25-50 INJECTION, SOLUTION INTRAVENOUS
Status: DISCONTINUED | OUTPATIENT
Start: 2023-01-01 | End: 2023-01-01

## 2023-01-01 RX ORDER — MIRTAZAPINE 7.5 MG/1
7.5 TABLET, FILM COATED ORAL AT BEDTIME
Status: DISCONTINUED | OUTPATIENT
Start: 2023-01-01 | End: 2023-01-01 | Stop reason: HOSPADM

## 2023-01-01 RX ORDER — ABIRATERONE ACETATE 250 MG/1
1000 TABLET ORAL EVERY MORNING
COMMUNITY

## 2023-01-01 RX ORDER — MIRTAZAPINE 7.5 MG/1
7.5 TABLET, FILM COATED ORAL AT BEDTIME
COMMUNITY

## 2023-01-01 RX ORDER — LIDOCAINE HYDROCHLORIDE 20 MG/ML
JELLY TOPICAL ONCE
Status: COMPLETED | OUTPATIENT
Start: 2023-01-01 | End: 2023-01-01

## 2023-01-01 RX ORDER — SODIUM CHLORIDE 9 MG/ML
INJECTION, SOLUTION INTRAVENOUS CONTINUOUS
Status: DISCONTINUED | OUTPATIENT
Start: 2023-01-01 | End: 2023-01-01

## 2023-01-01 RX ORDER — POLYETHYLENE GLYCOL 3350 17 G/17G
17 POWDER, FOR SOLUTION ORAL 2 TIMES DAILY
Status: DISCONTINUED | OUTPATIENT
Start: 2023-01-01 | End: 2023-01-01 | Stop reason: HOSPADM

## 2023-01-01 RX ORDER — LIDOCAINE 40 MG/G
CREAM TOPICAL
Status: DISCONTINUED | OUTPATIENT
Start: 2023-01-01 | End: 2023-01-01

## 2023-01-01 RX ORDER — NALOXONE HYDROCHLORIDE 0.4 MG/ML
0.4 INJECTION, SOLUTION INTRAMUSCULAR; INTRAVENOUS; SUBCUTANEOUS
Status: DISCONTINUED | OUTPATIENT
Start: 2023-01-01 | End: 2023-01-01 | Stop reason: HOSPADM

## 2023-01-01 RX ORDER — LANOLIN ALCOHOL/MO/W.PET/CERES
3 CREAM (GRAM) TOPICAL
Status: DISCONTINUED | OUTPATIENT
Start: 2023-01-01 | End: 2023-01-01 | Stop reason: HOSPADM

## 2023-01-01 RX ORDER — FUROSEMIDE 10 MG/ML
80 INJECTION INTRAMUSCULAR; INTRAVENOUS ONCE
Status: COMPLETED | OUTPATIENT
Start: 2023-01-01 | End: 2023-01-01

## 2023-01-01 RX ORDER — METOPROLOL TARTRATE 1 MG/ML
5 INJECTION, SOLUTION INTRAVENOUS EVERY 4 HOURS PRN
Status: DISCONTINUED | OUTPATIENT
Start: 2023-01-01 | End: 2023-01-01

## 2023-01-01 RX ORDER — POTASSIUM CHLORIDE 29.8 MG/ML
20 INJECTION INTRAVENOUS
Status: COMPLETED | OUTPATIENT
Start: 2023-01-01 | End: 2023-01-01

## 2023-01-01 RX ORDER — AMOXICILLIN 250 MG
2 CAPSULE ORAL 2 TIMES DAILY
Status: DISCONTINUED | OUTPATIENT
Start: 2023-01-01 | End: 2023-01-01 | Stop reason: HOSPADM

## 2023-01-01 RX ORDER — NOREPINEPHRINE BITARTRATE 0.02 MG/ML
.01-.6 INJECTION, SOLUTION INTRAVENOUS CONTINUOUS
Status: DISCONTINUED | OUTPATIENT
Start: 2023-01-01 | End: 2023-01-01 | Stop reason: HOSPADM

## 2023-01-01 RX ORDER — DEXTROSE MONOHYDRATE 25 G/50ML
25 INJECTION, SOLUTION INTRAVENOUS ONCE
Status: COMPLETED | OUTPATIENT
Start: 2023-01-01 | End: 2023-01-01

## 2023-01-01 RX ORDER — PROCHLORPERAZINE MALEATE 5 MG
5 TABLET ORAL EVERY 6 HOURS PRN
Status: DISCONTINUED | OUTPATIENT
Start: 2023-01-01 | End: 2023-01-01 | Stop reason: HOSPADM

## 2023-01-01 RX ORDER — DULOXETIN HYDROCHLORIDE 30 MG/1
30 CAPSULE, DELAYED RELEASE ORAL 2 TIMES DAILY
COMMUNITY

## 2023-01-01 RX ORDER — MEPERIDINE HYDROCHLORIDE 25 MG/ML
25 INJECTION INTRAMUSCULAR; INTRAVENOUS; SUBCUTANEOUS
Status: DISCONTINUED | OUTPATIENT
Start: 2023-01-01 | End: 2023-01-01 | Stop reason: HOSPADM

## 2023-01-01 RX ORDER — LIDOCAINE HYDROCHLORIDE 20 MG/ML
6 JELLY TOPICAL ONCE
Status: DISCONTINUED | OUTPATIENT
Start: 2023-01-01 | End: 2023-01-01

## 2023-01-01 RX ORDER — FENTANYL CITRATE 50 UG/ML
50 INJECTION, SOLUTION INTRAMUSCULAR; INTRAVENOUS ONCE
Status: COMPLETED | OUTPATIENT
Start: 2023-01-01 | End: 2023-01-01

## 2023-01-01 RX ORDER — CLINDAMYCIN PHOSPHATE 900 MG/50ML
900 INJECTION, SOLUTION INTRAVENOUS EVERY 8 HOURS
Status: DISCONTINUED | OUTPATIENT
Start: 2023-01-01 | End: 2023-01-01 | Stop reason: HOSPADM

## 2023-01-01 RX ORDER — LIDOCAINE 40 MG/G
CREAM TOPICAL
Status: DISCONTINUED | OUTPATIENT
Start: 2023-01-01 | End: 2023-01-01 | Stop reason: HOSPADM

## 2023-01-01 RX ORDER — IOPAMIDOL 755 MG/ML
500 INJECTION, SOLUTION INTRAVASCULAR ONCE
Status: COMPLETED | OUTPATIENT
Start: 2023-01-01 | End: 2023-01-01

## 2023-01-01 RX ORDER — CALCIUM CARBONATE 500 MG/1
1000 TABLET, CHEWABLE ORAL 4 TIMES DAILY PRN
Status: DISCONTINUED | OUTPATIENT
Start: 2023-01-01 | End: 2023-01-01 | Stop reason: HOSPADM

## 2023-01-01 RX ORDER — ISOSORBIDE MONONITRATE 30 MG/1
30 TABLET, EXTENDED RELEASE ORAL DAILY
COMMUNITY

## 2023-01-01 RX ORDER — ACETAMINOPHEN 325 MG/1
650 TABLET ORAL EVERY 4 HOURS PRN
Status: DISCONTINUED | OUTPATIENT
Start: 2023-01-01 | End: 2023-01-01 | Stop reason: HOSPADM

## 2023-01-01 RX ORDER — IOPAMIDOL 755 MG/ML
91 INJECTION, SOLUTION INTRAVASCULAR ONCE
Status: COMPLETED | OUTPATIENT
Start: 2023-01-01 | End: 2023-01-01

## 2023-01-01 RX ORDER — ENOXAPARIN SODIUM 100 MG/ML
0.75 INJECTION SUBCUTANEOUS EVERY 12 HOURS
Status: DISCONTINUED | OUTPATIENT
Start: 2023-01-01 | End: 2023-01-01 | Stop reason: HOSPADM

## 2023-01-01 RX ORDER — ACETAMINOPHEN 325 MG/1
650 TABLET ORAL 2 TIMES DAILY
COMMUNITY

## 2023-01-01 RX ORDER — ONDANSETRON 4 MG/1
4 TABLET, ORALLY DISINTEGRATING ORAL EVERY 6 HOURS PRN
Status: DISCONTINUED | OUTPATIENT
Start: 2023-01-01 | End: 2023-01-01 | Stop reason: HOSPADM

## 2023-01-01 RX ORDER — ABIRATERONE ACETATE 250 MG/1
1000 TABLET ORAL EVERY MORNING
Status: DISCONTINUED | OUTPATIENT
Start: 2023-01-01 | End: 2023-01-01 | Stop reason: HOSPADM

## 2023-01-01 RX ORDER — METOCLOPRAMIDE HYDROCHLORIDE 5 MG/ML
10 INJECTION INTRAMUSCULAR; INTRAVENOUS ONCE
Status: COMPLETED | OUTPATIENT
Start: 2023-01-01 | End: 2023-01-01

## 2023-01-01 RX ORDER — DULOXETIN HYDROCHLORIDE 30 MG/1
30 CAPSULE, DELAYED RELEASE ORAL 2 TIMES DAILY
Status: DISCONTINUED | OUTPATIENT
Start: 2023-01-01 | End: 2023-01-01 | Stop reason: HOSPADM

## 2023-01-01 RX ORDER — BISACODYL 10 MG
10 SUPPOSITORY, RECTAL RECTAL DAILY PRN
Status: DISCONTINUED | OUTPATIENT
Start: 2023-01-01 | End: 2023-01-01 | Stop reason: HOSPADM

## 2023-01-01 RX ORDER — CALCIUM GLUCONATE 94 MG/ML
1 INJECTION, SOLUTION INTRAVENOUS ONCE
Status: COMPLETED | OUTPATIENT
Start: 2023-01-01 | End: 2023-01-01

## 2023-01-01 RX ORDER — DEXTROSE MONOHYDRATE 25 G/50ML
25-50 INJECTION, SOLUTION INTRAVENOUS
Status: DISCONTINUED | OUTPATIENT
Start: 2023-01-01 | End: 2023-01-01 | Stop reason: HOSPADM

## 2023-01-01 RX ORDER — ROSUVASTATIN CALCIUM 20 MG/1
20 TABLET, COATED ORAL EVERY MORNING
Status: DISCONTINUED | OUTPATIENT
Start: 2023-01-01 | End: 2023-01-01 | Stop reason: HOSPADM

## 2023-01-01 RX ORDER — POLYETHYLENE GLYCOL 3350 17 G/17G
17 POWDER, FOR SOLUTION ORAL 2 TIMES DAILY PRN
Status: DISCONTINUED | OUTPATIENT
Start: 2023-01-01 | End: 2023-01-01 | Stop reason: HOSPADM

## 2023-01-01 RX ORDER — ROSUVASTATIN CALCIUM 20 MG/1
20 TABLET, COATED ORAL EVERY MORNING
COMMUNITY

## 2023-01-01 RX ORDER — POTASSIUM CHLORIDE 1500 MG/1
40 TABLET, EXTENDED RELEASE ORAL ONCE
Status: COMPLETED | OUTPATIENT
Start: 2023-01-01 | End: 2023-01-01

## 2023-01-01 RX ORDER — IBUPROFEN 600 MG/1
600 TABLET, FILM COATED ORAL ONCE
Status: COMPLETED | OUTPATIENT
Start: 2023-01-01 | End: 2023-01-01

## 2023-01-01 RX ORDER — BISACODYL 10 MG
10 SUPPOSITORY, RECTAL RECTAL ONCE
Status: COMPLETED | OUTPATIENT
Start: 2023-01-01 | End: 2023-01-01

## 2023-01-01 RX ORDER — AMOXICILLIN 250 MG
1 CAPSULE ORAL 2 TIMES DAILY PRN
Status: DISCONTINUED | OUTPATIENT
Start: 2023-01-01 | End: 2023-01-01 | Stop reason: HOSPADM

## 2023-01-01 RX ORDER — PIPERACILLIN SODIUM, TAZOBACTAM SODIUM 4; .5 G/20ML; G/20ML
4.5 INJECTION, POWDER, LYOPHILIZED, FOR SOLUTION INTRAVENOUS ONCE
Status: COMPLETED | OUTPATIENT
Start: 2023-01-01 | End: 2023-01-01

## 2023-01-01 RX ORDER — PROCHLORPERAZINE 25 MG
12.5 SUPPOSITORY, RECTAL RECTAL EVERY 12 HOURS PRN
Status: DISCONTINUED | OUTPATIENT
Start: 2023-01-01 | End: 2023-01-01 | Stop reason: HOSPADM

## 2023-01-01 RX ORDER — CARBOXYMETHYLCELLULOSE SODIUM 5 MG/ML
1 SOLUTION/ DROPS OPHTHALMIC EVERY 8 HOURS PRN
Status: DISCONTINUED | OUTPATIENT
Start: 2023-01-01 | End: 2023-01-01 | Stop reason: HOSPADM

## 2023-01-01 RX ORDER — GLYCOPYRROLATE 0.2 MG/ML
0.1 INJECTION, SOLUTION INTRAMUSCULAR; INTRAVENOUS EVERY 4 HOURS PRN
Status: DISCONTINUED | OUTPATIENT
Start: 2023-01-01 | End: 2023-01-01 | Stop reason: HOSPADM

## 2023-01-01 RX ORDER — GLYCOPYRROLATE 0.2 MG/ML
0.2 INJECTION, SOLUTION INTRAMUSCULAR; INTRAVENOUS ONCE
Status: COMPLETED | OUTPATIENT
Start: 2023-01-01 | End: 2023-01-01

## 2023-01-01 RX ORDER — AMOXICILLIN 250 MG
2 CAPSULE ORAL 2 TIMES DAILY PRN
Status: DISCONTINUED | OUTPATIENT
Start: 2023-01-01 | End: 2023-01-01 | Stop reason: HOSPADM

## 2023-01-01 RX ORDER — PREDNISONE 5 MG/1
5 TABLET ORAL 2 TIMES DAILY
COMMUNITY

## 2023-01-01 RX ORDER — METFORMIN HCL 500 MG
1000 TABLET, EXTENDED RELEASE 24 HR ORAL
COMMUNITY

## 2023-01-01 RX ORDER — NICOTINE POLACRILEX 4 MG
15-30 LOZENGE BUCCAL
Status: DISCONTINUED | OUTPATIENT
Start: 2023-01-01 | End: 2023-01-01 | Stop reason: HOSPADM

## 2023-01-01 RX ORDER — PREGABALIN 50 MG/1
50 CAPSULE ORAL 2 TIMES DAILY
COMMUNITY

## 2023-01-01 RX ORDER — SODIUM CHLORIDE 9 MG/ML
INJECTION, SOLUTION INTRAVENOUS CONTINUOUS
Status: ACTIVE | OUTPATIENT
Start: 2023-01-01 | End: 2023-01-01

## 2023-01-01 RX ORDER — PREGABALIN 50 MG/1
50 CAPSULE ORAL 2 TIMES DAILY
Status: DISCONTINUED | OUTPATIENT
Start: 2023-01-01 | End: 2023-01-01 | Stop reason: HOSPADM

## 2023-01-01 RX ORDER — ATROPINE SULFATE 10 MG/ML
1 SOLUTION/ DROPS OPHTHALMIC
Status: DISCONTINUED | OUTPATIENT
Start: 2023-01-01 | End: 2023-01-01 | Stop reason: HOSPADM

## 2023-01-01 RX ORDER — OXYBUTYNIN CHLORIDE 5 MG/1
10 TABLET ORAL 2 TIMES DAILY
COMMUNITY

## 2023-01-01 RX ADMIN — BISACODYL 10 MG: 10 SUPPOSITORY RECTAL at 13:27

## 2023-01-01 RX ADMIN — ACETAMINOPHEN 650 MG: 650 SUPPOSITORY RECTAL at 01:04

## 2023-01-01 RX ADMIN — SODIUM CHLORIDE 500 ML: 9 INJECTION, SOLUTION INTRAVENOUS at 05:14

## 2023-01-01 RX ADMIN — IOPAMIDOL 91 ML: 755 INJECTION, SOLUTION INTRAVENOUS at 17:12

## 2023-01-01 RX ADMIN — APIXABAN 5 MG: 5 TABLET, FILM COATED ORAL at 08:05

## 2023-01-01 RX ADMIN — DEXTROSE MONOHYDRATE 25 G: 25 INJECTION, SOLUTION INTRAVENOUS at 03:13

## 2023-01-01 RX ADMIN — SODIUM CHLORIDE 500 ML: 9 INJECTION, SOLUTION INTRAVENOUS at 14:40

## 2023-01-01 RX ADMIN — IBUPROFEN 600 MG: 600 TABLET, FILM COATED ORAL at 19:18

## 2023-01-01 RX ADMIN — ACETAMINOPHEN 650 MG: 325 TABLET, FILM COATED ORAL at 17:48

## 2023-01-01 RX ADMIN — HYDROCORTISONE SODIUM SUCCINATE 100 MG: 100 INJECTION, POWDER, FOR SOLUTION INTRAMUSCULAR; INTRAVENOUS at 17:48

## 2023-01-01 RX ADMIN — SODIUM CHLORIDE 1000 ML: 9 INJECTION, SOLUTION INTRAVENOUS at 15:01

## 2023-01-01 RX ADMIN — SODIUM CHLORIDE 66 ML: 9 INJECTION, SOLUTION INTRAVENOUS at 17:12

## 2023-01-01 RX ADMIN — ENOXAPARIN SODIUM 60 MG: 60 INJECTION SUBCUTANEOUS at 22:30

## 2023-01-01 RX ADMIN — PIPERACILLIN AND TAZOBACTAM 4.5 G: 4; .5 INJECTION, POWDER, FOR SOLUTION INTRAVENOUS at 17:30

## 2023-01-01 RX ADMIN — SODIUM BICARBONATE: 84 INJECTION, SOLUTION INTRAVENOUS at 01:18

## 2023-01-01 RX ADMIN — SODIUM CHLORIDE 8.2 UNITS: 9 INJECTION, SOLUTION INTRAVENOUS at 03:16

## 2023-01-01 RX ADMIN — POTASSIUM CHLORIDE 40 MEQ: 1500 TABLET, EXTENDED RELEASE ORAL at 13:27

## 2023-01-01 RX ADMIN — POTASSIUM CHLORIDE 20 MEQ: 29.8 INJECTION, SOLUTION INTRAVENOUS at 09:19

## 2023-01-01 RX ADMIN — ACETAMINOPHEN 650 MG: 325 TABLET, FILM COATED ORAL at 11:53

## 2023-01-01 RX ADMIN — PIPERACILLIN AND TAZOBACTAM 4.5 G: 4; .5 INJECTION, POWDER, FOR SOLUTION INTRAVENOUS at 23:52

## 2023-01-01 RX ADMIN — SODIUM CHLORIDE: 9 INJECTION, SOLUTION INTRAVENOUS at 21:58

## 2023-01-01 RX ADMIN — SODIUM CHLORIDE 1000 ML: 9 INJECTION, SOLUTION INTRAVENOUS at 19:42

## 2023-01-01 RX ADMIN — ACETAMINOPHEN 650 MG: 325 TABLET, FILM COATED ORAL at 12:49

## 2023-01-01 RX ADMIN — DEXTROSE MONOHYDRATE 300 ML: 100 INJECTION, SOLUTION INTRAVENOUS at 03:18

## 2023-01-01 RX ADMIN — POLYETHYLENE GLYCOL 3350 17 G: 17 POWDER, FOR SOLUTION ORAL at 08:04

## 2023-01-01 RX ADMIN — POTASSIUM CHLORIDE 20 MEQ: 29.8 INJECTION, SOLUTION INTRAVENOUS at 08:05

## 2023-01-01 RX ADMIN — POTASSIUM CHLORIDE 20 MEQ: 29.8 INJECTION, SOLUTION INTRAVENOUS at 06:23

## 2023-01-01 RX ADMIN — PIPERACILLIN AND TAZOBACTAM 4.5 G: 4; .5 INJECTION, POWDER, FOR SOLUTION INTRAVENOUS at 05:07

## 2023-01-01 RX ADMIN — CLINDAMYCIN PHOSPHATE 900 MG: 900 INJECTION, SOLUTION INTRAVENOUS at 10:17

## 2023-01-01 RX ADMIN — NOREPINEPHRINE BITARTRATE 0.25 MCG/KG/MIN: 0.02 INJECTION, SOLUTION INTRAVENOUS at 10:10

## 2023-01-01 RX ADMIN — VANCOMYCIN HYDROCHLORIDE 2000 MG: 10 INJECTION, POWDER, LYOPHILIZED, FOR SOLUTION INTRAVENOUS at 01:02

## 2023-01-01 RX ADMIN — PIPERACILLIN AND TAZOBACTAM 4.5 G: 4; .5 INJECTION, POWDER, FOR SOLUTION INTRAVENOUS at 10:33

## 2023-01-01 RX ADMIN — SODIUM CHLORIDE: 9 INJECTION, SOLUTION INTRAVENOUS at 22:02

## 2023-01-01 RX ADMIN — NOREPINEPHRINE BITARTRATE 0.09 MCG/KG/MIN: 0.02 INJECTION, SOLUTION INTRAVENOUS at 19:34

## 2023-01-01 RX ADMIN — HYDROCORTISONE SODIUM SUCCINATE 100 MG: 100 INJECTION, POWDER, FOR SOLUTION INTRAMUSCULAR; INTRAVENOUS at 10:33

## 2023-01-01 RX ADMIN — INSULIN ASPART 2 UNITS: 100 INJECTION, SOLUTION INTRAVENOUS; SUBCUTANEOUS at 06:25

## 2023-01-01 RX ADMIN — CALCIUM GLUCONATE 1 G: 98 INJECTION, SOLUTION INTRAVENOUS at 03:09

## 2023-01-01 RX ADMIN — Medication 25 MCG/HR: at 07:33

## 2023-01-01 RX ADMIN — FUROSEMIDE 80 MG: 10 INJECTION, SOLUTION INTRAMUSCULAR; INTRAVENOUS at 02:20

## 2023-01-01 RX ADMIN — NOREPINEPHRINE BITARTRATE 0.2 MCG/KG/MIN: 0.02 INJECTION, SOLUTION INTRAVENOUS at 13:27

## 2023-01-01 RX ADMIN — VASOPRESSIN 2.4 UNITS/HR: 20 INJECTION, SOLUTION INTRAMUSCULAR; SUBCUTANEOUS at 17:54

## 2023-01-01 RX ADMIN — FENTANYL CITRATE 50 MCG: 50 INJECTION, SOLUTION INTRAMUSCULAR; INTRAVENOUS at 20:23

## 2023-01-01 RX ADMIN — MEPERIDINE HYDROCHLORIDE 25 MG: 25 INJECTION INTRAMUSCULAR; INTRAVENOUS; SUBCUTANEOUS at 01:50

## 2023-01-01 RX ADMIN — VASOPRESSIN 2.4 UNITS/HR: 20 INJECTION, SOLUTION INTRAMUSCULAR; SUBCUTANEOUS at 10:20

## 2023-01-01 RX ADMIN — NOREPINEPHRINE BITARTRATE 0.03 MCG/KG/MIN: 0.02 INJECTION, SOLUTION INTRAVENOUS at 05:17

## 2023-01-01 RX ADMIN — PIPERACILLIN AND TAZOBACTAM 4.5 G: 4; .5 INJECTION, POWDER, FOR SOLUTION INTRAVENOUS at 22:54

## 2023-01-01 RX ADMIN — ROSUVASTATIN CALCIUM 20 MG: 20 TABLET, FILM COATED ORAL at 08:05

## 2023-01-01 RX ADMIN — PIPERACILLIN AND TAZOBACTAM 4.5 G: 4; .5 INJECTION, POWDER, FOR SOLUTION INTRAVENOUS at 05:20

## 2023-01-01 RX ADMIN — ACETAMINOPHEN 650 MG: 650 SUPPOSITORY RECTAL at 21:23

## 2023-01-01 RX ADMIN — POTASSIUM & SODIUM PHOSPHATES POWDER PACK 280-160-250 MG 1 PACKET: 280-160-250 PACK at 08:05

## 2023-01-01 RX ADMIN — SODIUM CHLORIDE 61 ML: 9 INJECTION, SOLUTION INTRAVENOUS at 15:18

## 2023-01-01 RX ADMIN — PIPERACILLIN AND TAZOBACTAM 4.5 G: 4; .5 INJECTION, POWDER, FOR SOLUTION INTRAVENOUS at 15:59

## 2023-01-01 RX ADMIN — VASOPRESSIN 2.4 UNITS/HR: 20 INJECTION, SOLUTION INTRAMUSCULAR; SUBCUTANEOUS at 01:08

## 2023-01-01 RX ADMIN — SODIUM BICARBONATE 25 MEQ: 84 INJECTION, SOLUTION INTRAVENOUS at 03:08

## 2023-01-01 RX ADMIN — Medication 50 MCG: at 07:39

## 2023-01-01 RX ADMIN — HYDROCORTISONE SODIUM SUCCINATE 100 MG: 100 INJECTION, POWDER, FOR SOLUTION INTRAMUSCULAR; INTRAVENOUS at 02:30

## 2023-01-01 RX ADMIN — CLINDAMYCIN PHOSPHATE 900 MG: 900 INJECTION, SOLUTION INTRAVENOUS at 17:56

## 2023-01-01 RX ADMIN — Medication 3 MG: at 23:47

## 2023-01-01 RX ADMIN — DOCUSATE SODIUM 50 MG AND SENNOSIDES 8.6 MG 1 TABLET: 8.6; 5 TABLET, FILM COATED ORAL at 08:05

## 2023-01-01 RX ADMIN — HUMAN ALBUMIN MICROSPHERES AND PERFLUTREN 9 ML: 10; .22 INJECTION, SOLUTION INTRAVENOUS at 11:23

## 2023-01-01 RX ADMIN — SODIUM PHOSPHATE, MONOBASIC, MONOHYDRATE AND SODIUM PHOSPHATE, DIBASIC, ANHYDROUS 15 MMOL: 142; 276 INJECTION, SOLUTION INTRAVENOUS at 10:32

## 2023-01-01 RX ADMIN — SODIUM CHLORIDE 1000 ML: 9 INJECTION, SOLUTION INTRAVENOUS at 17:30

## 2023-01-01 RX ADMIN — CLINDAMYCIN PHOSPHATE 900 MG: 900 INJECTION, SOLUTION INTRAVENOUS at 02:02

## 2023-01-01 RX ADMIN — BISACODYL 10 MG: 10 SUPPOSITORY RECTAL at 21:58

## 2023-01-01 RX ADMIN — IOPAMIDOL 83 ML: 755 INJECTION, SOLUTION INTRAVENOUS at 15:18

## 2023-01-01 ASSESSMENT — ACTIVITIES OF DAILY LIVING (ADL)
ADLS_ACUITY_SCORE: 34
ADLS_ACUITY_SCORE: 36
ADLS_ACUITY_SCORE: 37
ADLS_ACUITY_SCORE: 36
ADLS_ACUITY_SCORE: 35
ADLS_ACUITY_SCORE: 37
ADLS_ACUITY_SCORE: 35
ADLS_ACUITY_SCORE: 49
ADLS_ACUITY_SCORE: 35
ADLS_ACUITY_SCORE: 49
ADLS_ACUITY_SCORE: 36
ADLS_ACUITY_SCORE: 37
ADLS_ACUITY_SCORE: 49
ADLS_ACUITY_SCORE: 36
ADLS_ACUITY_SCORE: 34
ADLS_ACUITY_SCORE: 35
ADLS_ACUITY_SCORE: 36
ADLS_ACUITY_SCORE: 30
ADLS_ACUITY_SCORE: 36
ADLS_ACUITY_SCORE: 35
ADLS_ACUITY_SCORE: 37
ADLS_ACUITY_SCORE: 36
ADLS_ACUITY_SCORE: 37
ADLS_ACUITY_SCORE: 30
ADLS_ACUITY_SCORE: 37
ADLS_ACUITY_SCORE: 36
ADLS_ACUITY_SCORE: 30
ADLS_ACUITY_SCORE: 30
ADLS_ACUITY_SCORE: 34
ADLS_ACUITY_SCORE: 37
ADLS_ACUITY_SCORE: 34
ADLS_ACUITY_SCORE: 34
ADLS_ACUITY_SCORE: 32
ADLS_ACUITY_SCORE: 34

## 2023-11-29 PROBLEM — R53.1 GENERALIZED WEAKNESS: Status: ACTIVE | Noted: 2023-01-01

## 2023-11-29 PROBLEM — D64.9 ANEMIA: Status: ACTIVE | Noted: 2023-01-01

## 2023-11-29 PROBLEM — K59.00 CONSTIPATION: Status: ACTIVE | Noted: 2023-01-01

## 2023-11-29 PROBLEM — R19.5 GUAIAC POSITIVE STOOLS: Status: ACTIVE | Noted: 2023-01-01

## 2023-11-29 NOTE — ED PROVIDER NOTES
History     Chief Complaint:  Generalized Weakness       HPI   Shaan Weems is a 78 year old male with a past medical history significant for prostate cancer, atrial fibrillation on Eliquis, type 2 diabetes, heart failure on Lasix coming in from assisted living for evaluation of fatigue/generalized weakness for 1 week.  He has had associated dysuria and left lower quadrant abdominal pain.  He has also had constipation which is abnormal for him.  His last bowel movement was 2 days ago and was large.  He noticed streaks of blood in the stool.  Feels his breathing has been more labored and has had a difficult time taking a deep breath due to feelings of not expanding his lungs enough.  He denies any known sick contacts.  No recent medication changes.  Has not had any fevers, cough, chest pain, nausea, vomiting, diarrhea, hematuria, leg swelling.    Independent Historian:   None - Patient Only    Review of External Notes:   Cardiac echocardiogram 7/28/23:  Summary:    * There is moderate concentric left ventricular hypertrophy.     * The estimated ejection fraction is 65-70%.     * The left ventricular diastolic function is indeterminate.     * The LVOT velocity increases to  241 cm/s  with a mean gradient of   11   mmHg  after Valsalva maneuver.     * A pacemaker wire is visualized in the right ventricle.     * Normal right ventricular systolic function.     * There is trace pulmonic regurgitation.     * There is trace mitral regurgitation.     * There is trace tricuspid regurgitation.     * A pacemaker wire is visualized in the right atrium.     I reviewed the patient's care everywhere.  He gets primarily care through [facility].  I reviewed his medications, chronic health problems, and recent visits.    Medications:    No current outpatient medications on file.    Past Medical History:    No past medical history on file.    Past Surgical History:    No past surgical history on file.     Physical Exam  "  Patient Vitals for the past 24 hrs:   BP Temp Temp src Pulse Resp SpO2 Height Weight   11/29/23 2000 -- -- -- 75 16 95 % -- --   11/29/23 1959 -- -- -- 73 14 96 % -- --   11/29/23 1958 -- -- -- 73 23 95 % -- --   11/29/23 1916 -- -- -- 73 15 96 % -- --   11/29/23 1915 (!) 149/82 -- -- 70 27 98 % -- --   11/29/23 1820 (!) 148/91 -- -- 70 18 94 % -- --   11/29/23 1800 (!) 149/88 -- -- 70 18 93 % -- --   11/29/23 1750 (!) 153/89 -- -- 72 21 94 % -- --   11/29/23 1700 139/85 -- -- 71 13 94 % -- --   11/29/23 1600 (!) 132/98 -- -- 72 18 96 % -- --   11/29/23 1500 134/76 -- -- 70 12 93 % -- --   11/29/23 1430 121/85 -- -- 80 14 94 % -- --   11/29/23 1400 110/69 -- -- 70 14 95 % -- --   11/29/23 1336 (!) 109/94 98.2  F (36.8  C) Oral 87 18 -- 1.778 m (5' 10\") 81.6 kg (180 lb)        Physical Exam  BP (!) 149/82   Pulse 75   Temp 98.2  F (36.8  C) (Oral)   Resp 16   Ht 1.778 m (5' 10\")   Wt 81.6 kg (180 lb)   SpO2 95%   BMI 25.83 kg/m     General: Well-developed and well-nourished.  Appears tired.  Speaking quietly.  Examined in ED 02.  Head: Atraumatic. Normocephalic.  EENT: PERRL. EOMI. Dry mucus membranes.   CV: Regular rate and rhythm. No appreciable murmurs, rubs, or gallops.   Respiratory: Breathing comfortably on room air. Lungs clear to auscultation bilaterally without wheezes, rhonchi, or rales.  GI: Soft, non-distended.  Left lower quadrant tenderness to palpation. No rebound, rigidity, or guarding.   Msk: Extremities without tenderness to palpation or deformity.  Compression stockings present on lower extremities with no significant lower extremity edema.  Skin: Warm and dry. No rashes.  Neuro: Awake, alert, and conversant. No focal neurologic deficits.  Answers questions appropriately.  Speech clear.  Face symmetrical.  Tongue midline.  Psych: Appropriate mood and affect.    Emergency Department Course   ECG  ECG taken at 1339, ECG read at 1341  AV dual-paced rhythm.   Rate 70 bpm. MS interval 178 ms. " QRS duration 124 ms. QT/QTc 450/486 ms. P-R-T axes -5 -29 56.     Imaging:  XR Chest 2 Views   Final Result   IMPRESSION: Pulmonary vascular congestion. No infiltrate, pleural   effusion or pneumothorax. Mild cardiomegaly. Tortuous aortic arch.   Pacemaker.      ANA CARRILLO MD            SYSTEM ID:  [id number]      CT Abdomen Pelvis w Contrast   Final Result   IMPRESSION:    1.  By CT, no acute findings in the visualized abdomen or pelvis.      2.  Findings consistent with constipation in the correct clinical   setting.      3.  Possible 1.4 cm right colon polyp versus stool ball. This can be   correlated with nonemergent optical colonoscopy, if not recently   performed.      4.  Probable renal cysts, measuring up to 9.6 cm on the right. These   can be confirmed with nonemergent renal ultrasound.      MICHAEL PITTMAN MD            SYSTEM ID:  [id number]         Report per radiology    Laboratory:  Labs Ordered and Resulted from Time of ED Arrival to Time of ED Departure   BASIC METABOLIC PANEL - Abnormal       Result Value    Sodium 140      Potassium 4.2      Chloride 105      Carbon Dioxide (CO2) 24      Anion Gap 11      Urea Nitrogen 25.9 (*)     Creatinine 0.81      GFR Estimate >90      Calcium 9.0      Glucose 102 (*)    LACTIC ACID WHOLE BLOOD - Abnormal    Lactic Acid 2.5 (*)    CBC WITH PLATELETS AND DIFFERENTIAL - Abnormal    WBC Count 10.5      RBC Count 2.97 (*)     Hemoglobin 9.3 (*)     Hematocrit 29.9 (*)      (*)     MCH 31.3      MCHC 31.1 (*)     RDW 14.3      Platelet Count 186      % Neutrophils 89      % Lymphocytes 6      % Monocytes 4      % Eosinophils 0      % Basophils 0      % Immature Granulocytes 1      NRBCs per 100 WBC 0      Absolute Neutrophils 9.3 (*)     Absolute Lymphocytes 0.7 (*)     Absolute Monocytes 0.5      Absolute Eosinophils 0.0      Absolute Basophils 0.0      Absolute Immature Granulocytes 0.1      Absolute NRBCs 0.0     ROUTINE UA WITH MICROSCOPIC REFLEX TO  CULTURE - Abnormal    Color Urine Light Yellow      Appearance Urine Clear      Glucose Urine Negative      Bilirubin Urine Negative      Ketones Urine Negative      Specific Gravity Urine 1.020      Blood Urine Moderate (*)     pH Urine 6.5      Protein Albumin Urine 70 (*)     Urobilinogen Urine Normal      Nitrite Urine Negative      Leukocyte Esterase Urine Negative      Mucus Urine Present (*)     RBC Urine 1      WBC Urine <1     OCCULT BLOOD STOOL - Abnormal    Occult Blood Positive (*)    HEMOGLOBIN - Abnormal    Hemoglobin 9.8 (*)    INFLUENZA A/B, RSV, & SARS-COV2 PCR - Normal    Influenza A PCR Negative      Influenza B PCR Negative      RSV PCR Negative      SARS CoV2 PCR Negative     LACTIC ACID WHOLE BLOOD - Normal    Lactic Acid 1.4     NT PROBNP INPATIENT - Normal    N terminal Pro BNP Inpatient 796     TYPE AND SCREEN, ADULT    ABO/RH(D) A POS      Antibody Screen Negative      SPECIMEN EXPIRATION DATE [id number]     BLOOD CULTURE   BLOOD CULTURE   ABO/RH TYPE AND SCREEN      Emergency Department Course & Assessments:  Interventions:  Medications   lidocaine (XYLOCAINE) 2 % external gel 6 mL (has no administration in time range)   sodium chloride 0.9% BOLUS 500 mL (0 mLs Intravenous Stopped 11/29/23 1620)   iopamidol (ISOVUE-370) solution 500 mL (83 mLs Intravenous $Given 11/29/23 1518)   CT scan flush use (61 mLs As instructed $Given 11/29/23 1518)   ibuprofen (ADVIL/MOTRIN) tablet 600 mg (600 mg Oral $Given 11/29/23 1918)        Assessments and Consultations:  Patient was seen in conjunction with attending physician Dr. Diaz.    1410   I performed my initial evaluation of the patient  ED Course as of 11/29/23 2039 Wed Nov 29, 2023   1441 Guaiac testing performed w/ assistance of CURLY Donnelly.  Obvious bright red blood in stool.  Plan to type and screen.  Previous HGB 10.8 in August.   1610 Rechecked patient, updated on results thus far.  Feels slightly improved compared to arrival.     1833  Spoke with Lashaun Ritchie PA-C regarding admission.  Accepted patient for observation.     Independent Interpretation (X-rays, CTs, rhythm strip):  None.    Social Determinants of Health affecting care:   He lives in an assisted living facility.    Disposition:  The patient was admitted to the hospital under the care of Dr. Gonzales and Lashaun Ritchie PA-C.     Impression & Plan    CMS Diagnoses: The Lactic acid level is elevated due to dehydration, at this time there is no sign of severe sepsis or septic shock. and None    Medical Decision Making:  This is a 78-year-old male coming in today for evaluation of generalized weakness over the last 1 week.  On arrival, vital signs are stable.  Exam revealed dry mucous membranes and blood in the rectal vault with left lower quadrant tenderness to palpation.  Differential was broad and included anemia, electrolyte disturbance, urinary tract infection, intraabdominal infection (diverticulitis, pyelonephritis), viral syndrome.  Will no recent falls or head injuries to suggest intracranial bleed.  Lab work was notable for an anemia, which is decreased from 1.5 compared to 6 months ago.  This was rechecked and is stable.  Guaiac test is positive for occult blood, unsure if this is due to straining from constipation or true GI bleed.  Remainder of his lab work was reassuring, with negative urinalysis lactic acid, stable electrolytes, negative viral swab and normal BNP.  Chest x-ray showed pulmonary congestion with but otherwise no focal pneumonia.  CT abdomen pelvis with constipation but no intra-abdominal infection or diverticulitis.  He was given IV fluids here but ultimately felt concerned regarding going home.  I had a long discussion with the patient and his son and it sounds like he has had similar symptoms before with no clear etiology found.  Son expresses concerns as the patient was unable to get up independently from bed this morning which is an expectation for him  where he currently lives.  Feel the patient benefit from coming into the hospital for observation, further hemoglobin monitoring.    Addendum: Patient's EKG interrogation reviewed.  No acute events.    Diagnosis:    ICD-10-CM    1. Generalized weakness  R53.1       2. Anemia  D64.9       3. Guaiac positive stools  R19.5          Lashaun Naravez PA-C  November 29, 2023   [facility]           Lashaun Narvaez PA-C  11/29/23 2039       Lashaun Narvaez PA-C  11/29/23 2106

## 2023-11-29 NOTE — ED TRIAGE NOTES
"Pt arrived from assisted living facility where he said over the last week he has felt more and more weak. Pt also c/o UTI symptoms- states \"it feels like I did when I last had a uti.\" No cp/fever/diarrhea/n/v slight sob VSS A/ox4   *pacemaker- [place] [employer]      Triage Assessment (Adult)       Row Name 11/29/23 9329          Triage Assessment    Airway WDL WDL        Respiratory WDL    Respiratory WDL WDL        Cardiac WDL    Cardiac WDL WDL        Peripheral/Neurovascular WDL    Peripheral Neurovascular WDL WDL        Cognitive/Neuro/Behavioral WDL    Cognitive/Neuro/Behavioral WDL WDL                     "

## 2023-11-29 NOTE — ED PROVIDER NOTES
"ED ATTENDING PHYSICIAN NOTE:   I evaluated this patient in conjunction with Brice Wilks PA-C  I have participated in the care of the patient and personally performed key elements of the history, exam, and medical decision making.      HPI:   Shaan Weems is a 78 year old male with a past medical history significant for prostate cancer, atrial fibrillation on Eliquis, type 2 diabetes, heart failure on Lasix coming in from assisted living for evaluation of fatigue/generalized weakness for 1 week.  He has had associated dysuria and left lower quadrant abdominal pain.  He has also had constipation which is abnormal for him.  His last bowel movement was 2 days ago and was large.  He noticed streaks of blood in the stool.  Feels his breathing has been more labored and has had a difficult time taking a deep breath due to feelings of not expanding his lungs enough.  He denies any known sick contacts.  No recent medication changes.  Has not had any fevers, cough, chest pain, nausea, vomiting, diarrhea, hematuria, leg swelling.       Independent Historian:   None - Patient Only    Review of External Notes:  None     EXAM:   BP (!) 149/88   Pulse 70   Temp 98.2  F (36.8  C) (Oral)   Resp 18   Ht 1.778 m (5' 10\")   Wt 81.6 kg (180 lb)   SpO2 93%   BMI 25.83 kg/m    Constitutional: Vital signs reviewed.  Pleasant.  HEENT: Moist mucous membranes  Cardiovascular: Regular rate and rhythm  Pulmonary/Chest: Breathing comfortably on room air.  No audible wheezing  Musculoskeletal/Extremities: No bony deformities.  Moves all 4 extremities without difficulty.  Neurological: Alert.  No focal deficits.  Endo: No pitting edema  Skin: No visible rash.  Psychiatric: Pleasant.     Independent Interpretation (X-rays, CTs, rhythm strip):  Chest x-ray showed pulmonary vascular congestion without infiltrate effusion or pneumothorax per my interpretation.    CT scan of the abdomen pelvis was negative for any acute findings.  There is " signs of constipation however    Consultations/Discussion of Management or Tests:  None    Social Determinants of Health affecting care:   Patient lives in assisted living and currently needs more care than he is getting there.     MEDICAL DECISION MAKING/ASSESSMENT AND PLAN:   Patient presents the emergency department with increased fatigue and weakness.  He was unable to get out of bed today for the most part.  Very thorough workup here does not show any obvious major concerning findings however given his inability to get around and the fact that he is in assisted living with minimal cares his son does not feel safe for him to go home.  He will be admitted more for a placement issue tonight.  The hospitalist service was gracious to take him to the obs unit.     DIAGNOSIS:     ICD-10-CM    1. Generalized weakness  R53.1       2. Anemia  D64.9       3. Guaiac positive stools  R19.5              DISPOSITION:   Patient was brought into the obs unit     Scribe Disclosure:  IPerez, am serving as a scribe at 3:08 PM on 11/29/2023 to document services personally performed by Chester Diaz MD based on my observations and the provider's statements to me.   11/29/2023  [facility] EMERGENCY DEPT       Chester Diaz MD  11/29/23 2837

## 2023-11-29 NOTE — ED NOTES
Bed: ED02  Expected date: 11/29/23  Expected time: 1:27 PM  Means of arrival: Ambulance  Comments:  [facility]

## 2023-11-30 NOTE — PLAN OF CARE
[facility]    ED Boarding Nurse Handoff Addendum Report:    Date/time: 11/30/2023, 5:20 AM    Activity Level: assist of 1    Fall Risk: Yes:  activity supervised    Active Infusions:  mL/hr    Current Meds Due: None    Current care needs: Monitor general weakness and pain and constipation    Oxygen requirements (liters/min and/or FiO2): NA    Respiratory status: Room air    Vital signs (within last 30 minutes):    Vitals:    11/29/23 1959 11/29/23 2000 11/29/23 2117 11/30/23 0300   BP:   96/55 115/77   BP Location:   Left arm Right arm   Pulse: 73 75  70   Resp: 14 16  16   Temp:   97.8  F (36.6  C) 98.2  F (36.8  C)   TempSrc:   Oral Oral   SpO2: 96% 95%  96%   Weight:       Height:           Focused assessment within last 30 minutes:    AOx4, pacemaker, social work and PT consult, gave 1 suppository with results     ED Boarding Nurse name: Shanna Garcia RN

## 2023-11-30 NOTE — ED NOTES
[facility]  ED Nurse Handoff Report    ED Chief complaint: Generalized Weakness  . ED Diagnosis:   Final diagnoses:   Anemia   Generalized weakness   Guaiac positive stools       Allergies: Not on File    Code Status: Full Code    Activity level - Baseline/Home:  independent.  Activity Level - Current:   assist of 1.   Lift room needed: No.   Bariatric: No   Needed: No   Isolation: No.   Infection: Not Applicable.     Respiratory status: Room air    Vital Signs (within 30 minutes):   Vitals:    11/29/23 1600 11/29/23 1700 11/29/23 1750 11/29/23 1800   BP: (!) 132/98 139/85 (!) 153/89 (!) 149/88   Pulse: 72 71 72 70   Resp: 18 13 21 18   Temp:       TempSrc:       SpO2: 96% 94% 94% 93%   Weight:       Height:           Cardiac Rhythm:  ,      Pain level:    Patient confused: No.   Patient Falls Risk: activity supervised, mobility aid in reach, and room door open.   Elimination Status: Has voided     Patient Report - Initial Complaint: weakness .   Focused Assessment: PI:   Shaan Weems is a 78 year old male with a past medical history significant for prostate cancer, atrial fibrillation on Eliquis, type 2 diabetes, heart failure on Lasix coming in from assisted living for evaluation of fatigue/generalized weakness for 1 week.  He has had associated dysuria and left lower quadrant abdominal pain.  He has also had constipation which is abnormal for him.  His last bowel movement was 2 days ago and was large.  He noticed streaks of blood in the stool.  Feels his breathing has been more labored and has had a difficult time taking a deep breath due to feelings of not expanding his lungs enough.  He denies any known sick contacts.  No recent medication changes.  Has not had any fevers, cough, chest pain, nausea, vomiting, diarrhea, hematuria, leg swelling.      Constitutional: Vital signs reviewed.  Pleasant.  HEENT: Moist mucous membranes  Cardiovascular: Regular rate and  rhythm  Pulmonary/Chest: Breathing comfortably on room air.  No audible wheezing  Musculoskeletal/Extremities: No bony deformities.  Moves all 4 extremities without difficulty.  Neurological: Alert.  No focal deficits.  Endo: No pitting edema  Skin: No visible rash.  Psychiatric: Pleasant.     Abnormal Results:   Labs Ordered and Resulted from Time of ED Arrival to Time of ED Departure   BASIC METABOLIC PANEL - Abnormal       Result Value    Sodium 140      Potassium 4.2      Chloride 105      Carbon Dioxide (CO2) 24      Anion Gap 11      Urea Nitrogen 25.9 (*)     Creatinine 0.81      GFR Estimate >90      Calcium 9.0      Glucose 102 (*)    LACTIC ACID WHOLE BLOOD - Abnormal    Lactic Acid 2.5 (*)    CBC WITH PLATELETS AND DIFFERENTIAL - Abnormal    WBC Count 10.5      RBC Count 2.97 (*)     Hemoglobin 9.3 (*)     Hematocrit 29.9 (*)      (*)     MCH 31.3      MCHC 31.1 (*)     RDW 14.3      Platelet Count 186      % Neutrophils 89      % Lymphocytes 6      % Monocytes 4      % Eosinophils 0      % Basophils 0      % Immature Granulocytes 1      NRBCs per 100 WBC 0      Absolute Neutrophils 9.3 (*)     Absolute Lymphocytes 0.7 (*)     Absolute Monocytes 0.5      Absolute Eosinophils 0.0      Absolute Basophils 0.0      Absolute Immature Granulocytes 0.1      Absolute NRBCs 0.0     ROUTINE UA WITH MICROSCOPIC REFLEX TO CULTURE - Abnormal    Color Urine Light Yellow      Appearance Urine Clear      Glucose Urine Negative      Bilirubin Urine Negative      Ketones Urine Negative      Specific Gravity Urine 1.020      Blood Urine Moderate (*)     pH Urine 6.5      Protein Albumin Urine 70 (*)     Urobilinogen Urine Normal      Nitrite Urine Negative      Leukocyte Esterase Urine Negative      Mucus Urine Present (*)     RBC Urine 1      WBC Urine <1     OCCULT BLOOD STOOL - Abnormal    Occult Blood Positive (*)    HEMOGLOBIN - Abnormal    Hemoglobin 9.8 (*)    INFLUENZA A/B, RSV, & SARS-COV2 PCR - Normal     Influenza A PCR Negative      Influenza B PCR Negative      RSV PCR Negative      SARS CoV2 PCR Negative     LACTIC ACID WHOLE BLOOD - Normal    Lactic Acid 1.4     NT PROBNP INPATIENT - Normal    N terminal Pro BNP Inpatient 796     TYPE AND SCREEN, ADULT    ABO/RH(D) A POS      Antibody Screen Negative      SPECIMEN EXPIRATION DATE [id number]     BLOOD CULTURE   BLOOD CULTURE   ABO/RH TYPE AND SCREEN        XR Chest 2 Views   Final Result   IMPRESSION: Pulmonary vascular congestion. No infiltrate, pleural   effusion or pneumothorax. Mild cardiomegaly. Tortuous aortic arch.   Pacemaker.      ANA CARRILLO MD            SYSTEM ID:  [id number]      CT Abdomen Pelvis w Contrast   Final Result   IMPRESSION:    1.  By CT, no acute findings in the visualized abdomen or pelvis.      2.  Findings consistent with constipation in the correct clinical   setting.      3.  Possible 1.4 cm right colon polyp versus stool ball. This can be   correlated with nonemergent optical colonoscopy, if not recently   performed.      4.  Probable renal cysts, measuring up to 9.6 cm on the right. These   can be confirmed with nonemergent renal ultrasound.      MICHAEL PITTMAN MD            SYSTEM ID:  [id number]          Treatments provided: see mar / see above   Family Comments: none   OBS brochure/video discussed/provided to patient:  Yes  ED Medications:   Medications   lidocaine (XYLOCAINE) 2 % external gel 6 mL (has no administration in time range)   sodium chloride 0.9% BOLUS 500 mL (0 mLs Intravenous Stopped 11/29/23 1620)   iopamidol (ISOVUE-370) solution 500 mL (83 mLs Intravenous $Given 11/29/23 1518)   CT scan flush use (61 mLs As instructed $Given 11/29/23 1518)       Drips infusing:  No  For the majority of the shift this patient was Green.   Interventions performed were none .    Sepsis treatment initiated: No    Cares/treatment/interventions/medications to be completed following ED care: see above     ED Nurse Name: Cony  BERNIE Jackson RN  7:02 PM

## 2023-11-30 NOTE — CONSULTS
Care Management Initial Consult    General Information  Assessment completed with: Patient, Care Team Member, Patient, Sharron Bautista  Type of CM/SW Visit: Initial Assessment    Primary Care Provider verified and updated as needed: No   Readmission within the last 30 days: no previous admission in last 30 days      Reason for Consult: discharge planning  Advance Care Planning:            Communication Assessment  Patient's communication style: spoken language (English or Bilingual)             Cognitive  Cognitive/Neuro/Behavioral: WDL                      Living Environment:   People in home: alone     Current living Arrangements: assisted living  Name of Facility: [facility]   Able to return to prior arrangements: yes       Family/Social Support:  Care provided by: self, homecare agency, other (see comments) (facility staff)  Provides care for: no one                Description of Support System:           Current Resources:   Patient receiving home care services: Yes     Community Resources: Home Care  Equipment currently used at home:    Supplies currently used at home:      Employment/Financial:  Employment Status:          Financial Concerns: none   Referral to Financial Worker: No       Does the patient's insurance plan have a 3 day qualifying hospital stay waiver?  No    Lifestyle & Psychosocial Needs:  Social Determinants of Health     Food Insecurity: Not on file   Depression: Not on file   Housing Stability: Not on file   Tobacco Use: Not on file   Financial Resource Strain: Not on file   Alcohol Use: Not on file   Transportation Needs: Not on file   Physical Activity: Not on file   Interpersonal Safety: Not on file   Stress: Not on file   Social Connections: Not on file       Functional Status:  Prior to admission patient needed assistance:   Dependent ADLs:: Ambulation-walker, Wheelchair-independent     Assesssment of Functional Status: Not at baseline with ADL Functioning, Not at baseline  with mobility, Not at  functional baseline    Mental Health Status:  Mental Health Status: No Current Concerns       Chemical Dependency Status:  Chemical Dependency Status: No Current Concerns             Values/Beliefs:  Spiritual, Cultural Beliefs, [employer] Practices, Values that affect care:                 Additional Information:  SW met with patient at bedside while boarding in the ED. Patient has a URR of 7%.     Patient lives at [facility] P: [phone]. Patient states he is getting therapy services there in [facility]. Patient gave SW permission to contact the facility. [facility] requesting a call back.     Patient uses a walker and a wheelchair.     Patient is unsure about transportation at this time.     Addendum 1117: GINNY Milligan [facility] saying to call her work cell: [phone]. SW called and it went to [facility] requesting a call back.     Addendum 1321: SW spoke with [facility]. He gets med management, bathing help, and AM and PM cares. Up until a few weeks ago, he was ambulating on his own with a walker. He has needed assistance with is wheelchair now. Patient has PT and OT with an in house provider at his [facility]. They would want patient to get additional home care so he can have an RN as well. They prefer interim. Awaiting PT recs. Fax: [phone].    CICI Stoddard, LGDENNIS  Emergency Room   Please contact the SW on the floor in which the patient is staying for any questions or concerns

## 2023-11-30 NOTE — PROGRESS NOTES
Care Management Discharge Note    Discharge Date: 11/30/2023       Discharge Disposition: Assisted Living, Home Care    Discharge Services: None    Discharge DME: None    Discharge Transportation:  family    Private pay costs discussed: insurance costs out of pocket expenses    Does the patient's insurance plan have a 3 day qualifying hospital stay waiver?  No    PAS Confirmation Code:    Patient/family educated on Medicare website which has current facility and service quality ratings:      Education Provided on the Discharge Plan:    Persons Notified of Discharge Plans: Patient [facility]   Patient/Family in Agreement with the Plan: yes    Handoff Referral Completed: No    Additional Information:  Discharge orders were placed. DENNIS paged MD for RN Home care orders per [facility] request. DENNIS called [facility] main and cell line to see if they can take patient back. No answer. Faxed orders. DENNIS went to ask RN about patient, however patient discharged. DENNIS called patient to see if he is interested in home care. Patient states he would like to work on getting an RN with his facility.     DENNIS called [facility] back and informed them via vm that patient left and patient's preference to get home care with the [facility] assistance.     CICI Stoddard, LGSW  Emergency Room   Please contact the DENNIS on the floor in which the patient is staying for any questions or concerns

## 2023-11-30 NOTE — PHARMACY-ADMISSION MEDICATION HISTORY
Pharmacist Admission Medication History    Admission medication history is complete. The information provided in this note is only as accurate as the sources available at the time of the update.    Information Source(s): Facility ([facility]/) medication list/MAR via N/A    Pertinent Information: It appears that the oxybutynin was changed from 10mg XL to 10mg immediate release twice daily on 11/19     Changes made to PTA medication list:  Added: All meds were added today  Deleted: None  Changed: None    Medication Affordability:  Not including over the counter (OTC) medications, was there a time in the past 3 months when you did not take your medications as prescribed because of cost?: Unable to Assess    Allergies reviewed with patient and updates made in EHR:  Yes, transferred over from facility list.     Medication History Completed By: Alberto Iraheta RPH 11/30/2023 1:11 PM    PTA Med List   Medication Sig Last Dose    abiraterone (ZYTIGA) 250 MG tablet Take 1,000 mg by mouth every morning 11/29/2023 at AM    acetaminophen (TYLENOL) 325 MG tablet Take 650 mg by mouth 2 times daily 11/29/2023    apixaban ANTICOAGULANT (ELIQUIS) 5 MG tablet Take 5 mg by mouth 2 times daily 11/29/2023 at AM    cholecalciferol (VITAMIN D3) 25 mcg (1000 units) capsule Take 1 capsule by mouth 2 times daily 11/29/2023 at AM    DULoxetine (CYMBALTA) 30 MG capsule Take 30 mg by mouth 2 times daily 11/29/2023 at AM    isosorbide mononitrate (IMDUR) 30 MG 24 hr tablet Take 30 mg by mouth daily 11/29/2023 at AM    melatonin 3 MG tablet Take 9 mg by mouth nightly as needed for sleep 11/28/2023 at PM    metFORMIN (GLUCOPHAGE XR) 500 MG 24 hr tablet Take 1,000 mg by mouth daily (with dinner) 11/28/2023 at PM    metoprolol succinate ER (TOPROL XL) 100 MG 24 hr tablet Take 100 mg by mouth 2 times daily 11/29/2023 at AM    mirtazapine (REMERON) 7.5 MG tablet Take 7.5 mg by mouth at bedtime 11/28/2023 at PM    oxyBUTYnin (DITROPAN) 5 MG tablet Take  10 mg by mouth 2 times daily 11/29/2023 at AM    predniSONE (DELTASONE) 5 MG tablet Take 5 mg by mouth 2 times daily 11/29/2023 at AM    pregabalin (LYRICA) 50 MG capsule Take 50 mg by mouth 2 times daily 11/29/2023 at AM    rosuvastatin (CRESTOR) 20 MG tablet Take 20 mg by mouth every morning 11/29/2023 at AM

## 2023-11-30 NOTE — PLAN OF CARE
"son called frustrated and upset that father did not get a room in the hospital, as he says that we cannot claim that the services are the same when \"pts do not get to choose their meals as they do upstairs and we do not provide the same type of comfortable bed\". Son also saying that pt was given the option of going home yesterday or stays  in the hospital, but hospital staff did not tell him them that the hospital is full nd that he may have had to spend the boarding. Son also saying that the he already gets pt/ot everyday so questioning the plan. Hospitalist and SW notified. Pt ambulating in room with A1, incontinent of urine, some back pain, says related to bed/card discomfort. Will keep monitoring.   "

## 2023-11-30 NOTE — ED NOTES
Pt put on call light for assistance to commode. Pt was able to pivot with a one-by-assist. Pt mentioned that IV site was bothering him and site was bleeding and appeared swollen. Fluids were paused an RN notified.

## 2023-11-30 NOTE — H&P
[facility]    History and Physical - Hospitalist Service       Date of Admission:  11/29/2023    Assessment & Plan      Shaan Weems is a 78 year old male with PMHx significant for PAF on Eliquis s/p ablation and pacemaker 5/2023, prostate cancer, thyroid ca, diabetes type 2, HTN, HLP, GERD, SAH 6/2023, depression, and anxiety, admitted on 11/29/2023 with generalized weakness and constipation.    1.  Generalized weakness  Constipation  Increasing fatigue and weakness for the past week, notes dysuria and lower abdominal pain as well. Felt similar to previous UTI. Notes constipation, ongoing over the past several months, notes intermittent blood in stool.  Notes mild nausea, denies chest pain.  Notes mild shortness of breath.  Patient notes constipation since his heart attack this past summer and subsequent fall resulting in subarachnoid hemorrhage.  Has known possible 1.4 cm right colon polyp versus stool ball.  Colonoscopy has been recommended for further workup, this is yet to be done.  Vital signs stable in the ED.  BMP is fairly unremarkable.  Lactic acid was mildly elevated 2.5.  BNP was within normal limits.  CBC is fairly unremarkable, hemoglobin 9.3--> 9.8 on recheck.  Lactic acid improved to 1.4 after 500 mL normal saline bolus.  UA is unremarkable.  COVID, influenza, and RSV are negative.  Chest x-ray shows pulmonary vascular congestion otherwise unremarkable.  CT of the abdomen pelvis is negative for acute findings, findings are consistent with constipation, 1.4 cm right colon polyp versus stool ball again noted.  Right renal cyst noted.  -Admit to OBS  -start bowel regimen. Senna BID, Miralax BID and dulcolax suppository once  -IVFs, NS @ 100 ml/hr for 10 hrs  -regular diet  -PT/SW consults  -GI referral for colonoscopy. Doubt colonoscopy needs to be done inpatient    2. Chronic anemia  Hgb 9.3 on admission, 9.8 on recheck without intervention. Baseline Hgb seems to run around 10.  "Notes intermittent blood in stools, possibly due to large polyp noted in colon.   - recheck Hgb in AM  - referral to GI for outpatient follow up    Med rec pending, resume home meds once done    3. PAF on Eliquis   S/p ablation and pacemaker 5/2023  Nonobstructive CAD  HTN  - appears to be anticoagulated on Eliquis, resume when med rec complete  - appears to be on metoprolol and Imdur, resume    4. Diabetes type 2  Appears to be managed with metformin, resume   5. Prostate cancer  Thyroid ca  - appears to be on oxybutynin, resume  6. HLP  - on statin, hold while OBS  7. GERD  - appears to be on a PPI, resume  8. Hx of SAH 6/2023  9. Depression and anxiety  - appears to be on Cymbalta and Remeron and Lyrica        Diet: Regular Diet Adult    DVT Prophylaxis: DOAC  Pang Catheter: Not present  Lines: None     Cardiac Monitoring: None  Code Status:  DNR/DNI, discussed with patient     Clinically Significant Risk Factors Present on Admission                       # Overweight: Estimated body mass index is 25.83 kg/m  as calculated from the following:    Height as of this encounter: 1.778 m (5' 10\").    Weight as of this encounter: 81.6 kg (180 lb).              Disposition Plan      Expected Discharge Date: 11/30/2023                The patient's care was discussed with the Patient and ED provider, Dr. Diaz .    Lashaun Ritchie PA-C  Hospitalist Service  [facility]  Securely message with [employer]â„¢ (more info)  Text page via [facility] Paging/Directory     ______________________________________________________________________    Chief Complaint   Generalize weakness    History is obtained from the patient    History of Present Illness   Shaan Weems is a 78 year old male with PMHx significant for PAF on Eliquis s/p ablation and pacemaker 5/2023, prostate cancer, thyroid ca, diabetes type 2, HTN, HLP, GERD, SAH 6/2023, depression, and anxiety, who presents to the ED with generalized weakness.  He " states over the past week he has been coming increasingly fatigued and weak.  He notes some dysuria and lower abdominal pain that feels similar to previous UTI.  He notes recent issues with constipation, stating he has had issues with constipation since his heart attack this past summer.  Prior to this, he denies having issues with constipation.  He notes intermittent blood in his stool.  He denies fever, chills, chest pain, vomiting or diarrhea.  He does note mild nausea as well as some mild shortness of breath lately.  He notes he had a very large hard bowel movement 2 days ago      Past Medical History    PAF on Eliquis s/p ablation and pacemaker 5/2023  prostate cancer  thyroid ca  diabetes type 2  HTN  HLP  GERD  SAH 6/2023  Depression  anxiety    Past Surgical History   Pacemaker    Prior to Admission Medications   None    Med rec pending       Physical Exam   Vital Signs: Temp: 98.2  F (36.8  C) Temp src: Oral BP: (!) 149/82 Pulse: 75   Resp: 16 SpO2: 95 %      Weight: 180 lbs 0 oz    GENERAL:  Comfortable.  PSYCH: pleasant, oriented, No acute distress.  HEENT:  Atraumatic, normocephalic. Normal conjunctiva, normal hearing, and oropharynx is normal.  NECK:  Supple, no neck vein distention.  HEART:  Normal S1, S2 with no murmur, no pericardial rub, gallops or S3 or S4.  LUNGS:  Clear to auscultation, normal Respiratory effort. Appears somwhat breathless at times. No wheezing, rales or ronchi.  GI:  Soft, normal bowel sounds. Non-tender, non distended.   EXTREMITIES:  1+ bilateral LE edema, +2 pulses bilateral and equal.  SKIN:  Dry to touch, No rash, wound or ulcerations.  NEUROLOGIC:  CN 2-12 intact, BL 5/5 symmetric upper and lower extremity strength, sensation is intact with no focal deficits.      Medical Decision Making       65 MINUTES SPENT BY ME on the date of service doing chart review, history, exam, documentation & further activities per the note.      Data     I have personally reviewed the  following data over the past 24 hrs:    10.5  \   9.8 (L)   / 186     140 105 25.9 (H) /  102 (H)   4.2 24 0.81 \     Trop: N/A BNP: 796     Procal: N/A CRP: N/A Lactic Acid: 1.4         Imaging results reviewed over the past 24 hrs:   Recent Results (from the past 24 hour(s))   CT Abdomen Pelvis w Contrast    Narrative    CT ABDOMEN PELVIS W CONTRAST 11/29/2023 3:29 PM    CLINICAL HISTORY: LLQ pain, hx of nodule in sigmoid colon    TECHNIQUE: CT scan of the abdomen and pelvis was performed following  injection of IV contrast. Multiplanar reformats were obtained. Dose  reduction techniques were used.  CONTRAST: 83mL Isovue-370    COMPARISON: None.    FINDINGS:   LOWER CHEST: Scattered centrilobular emphysema and subsegmental  atelectasis. Left lower lobe calcified granuloma. Pacer leads.    HEPATOBILIARY: Calcified granulomas.    PANCREAS: Fatty replaced pancreas.    SPLEEN: Splenic granulomas.    ADRENAL GLANDS: Normal.    KIDNEYS/BLADDER: Low-density lesions in the kidneys, measuring up to  9.6 cm on the right. In the right renal pelvis is a nonobstructive  calculus measuring 3 mm. No hydronephrosis. Urinary bladder  unremarkable.    BOWEL: Probable fundoplication. No small bowel obstruction. Large  amount of formed stool in the colon with scattered diverticula. In the  ascending colon, there is a rounded mildly hyperdense nodule measuring  1.4 cm series 3, image 149. See also coronal image 48. This may be  attached to the wall.    PELVIC ORGANS: Probable atrophic prostate with fiducial markers.    ADDITIONAL FINDINGS: No abdominopelvic lymphadenopathy. No ascites. No  free air. Fat-containing left inguinal hernia. No abdominal aortic  aneurysm.    MUSCULOSKELETAL: Negative bones. Mild right gynecomastia.      Impression    IMPRESSION:   1.  By CT, no acute findings in the visualized abdomen or pelvis.    2.  Findings consistent with constipation in the correct clinical  setting.    3.  Possible 1.4 cm right colon  polyp versus stool ball. This can be  correlated with nonemergent optical colonoscopy, if not recently  performed.    4.  Probable renal cysts, measuring up to 9.6 cm on the right. These  can be confirmed with nonemergent renal ultrasound.    MICHAEL PITTMAN MD         SYSTEM ID:  [id number]   XR Chest 2 Views    Narrative    XR CHEST 2 VIEWS 11/29/2023 3:48 PM    HISTORY: Difficulty w/ deep breathing    COMPARISON: None.      Impression    IMPRESSION: Pulmonary vascular congestion. No infiltrate, pleural  effusion or pneumothorax. Mild cardiomegaly. Tortuous aortic arch.  Pacemaker.    ANA CARRILLO MD         SYSTEM ID:  [id number]

## 2023-12-03 PROBLEM — K59.00 CONSTIPATION, UNSPECIFIED CONSTIPATION TYPE: Status: ACTIVE | Noted: 2023-01-01

## 2023-12-03 PROBLEM — R79.89 ELEVATED TROPONIN: Status: ACTIVE | Noted: 2023-01-01

## 2023-12-03 PROBLEM — A41.9 SEPSIS, DUE TO UNSPECIFIED ORGANISM, UNSPECIFIED WHETHER ACUTE ORGAN DYSFUNCTION PRESENT (H): Status: ACTIVE | Noted: 2023-01-01

## 2023-12-03 PROBLEM — N39.0 URINARY TRACT INFECTION WITHOUT HEMATURIA, SITE UNSPECIFIED: Status: ACTIVE | Noted: 2023-01-01

## 2023-12-03 NOTE — ED NOTES
Bed: ED10  Expected date:   Expected time:   Means of arrival:   Comments:  [place] 524 78m weakness possible UTI eta 1433

## 2023-12-04 NOTE — SIGNIFICANT EVENT
RRT called @ 2219. Patient is on Oxygen mask. RR:35. O2 sat @ 97% @6L.  T:102.8F. Prince on-call doctor. Patient had decreased consciousness. Needs to transfer to Heart center for more level of care and frequent assessment.

## 2023-12-04 NOTE — PHARMACY-ADMISSION MEDICATION HISTORY
Pharmacist Admission Medication History    Admission medication history is complete. The information provided in this note is only as accurate as the sources available at the time of the update.    Information Source(s): Facility ([facility] medication list/MAR via N/A    Pertinent Information: Medication list compiled entirely from [facility] on [facility] MAR    Changes made to PTA medication list:  Added: None  Deleted: None  Changed: None    Medication Affordability:  Not including over the counter (OTC) medications, was there a time in the past 3 months when you did not take your medications as prescribed because of cost?: Unable to Assess    Allergies reviewed with patient and updates made in EHR: unable to assess    Medication History Completed By: Chester Neely [facility] 12/3/2023 6:55 PM    PTA Med List   Medication Sig Last Dose    abiraterone (ZYTIGA) 250 MG tablet Take 1,000 mg by mouth every morning 12/3/2023 at 1046    acetaminophen (TYLENOL) 325 MG tablet Take 650 mg by mouth 2 times daily 12/3/2023 at 0758    apixaban ANTICOAGULANT (ELIQUIS) 5 MG tablet Take 5 mg by mouth 2 times daily 12/3/2023 at 0758    cholecalciferol (VITAMIN D3) 25 mcg (1000 units) capsule Take 1 capsule by mouth 2 times daily 12/3/2023 at 0758    DULoxetine (CYMBALTA) 30 MG capsule Take 30 mg by mouth 2 times daily 12/3/2023 at 0758    isosorbide mononitrate (IMDUR) 30 MG 24 hr tablet Take 30 mg by mouth daily 12/3/2023 at 0758    melatonin 3 MG tablet Take 9 mg by mouth nightly as needed for sleep 12/2/2023 at 2152    metFORMIN (GLUCOPHAGE XR) 500 MG 24 hr tablet Take 1,000 mg by mouth daily (with dinner) 12/2/2023 at 2152    metoprolol succinate ER (TOPROL XL) 100 MG 24 hr tablet Take 100 mg by mouth 2 times daily 12/3/2023 at 0758    mirtazapine (REMERON) 7.5 MG tablet Take 7.5 mg by mouth at bedtime 12/2/2023 at 2152    oxyBUTYnin (DITROPAN) 5 MG tablet Take 10 mg by mouth 2 times daily 12/3/2023 at 0758    predniSONE (DELTASONE) 5  MG tablet Take 5 mg by mouth 2 times daily 12/3/2023 at 0758    pregabalin (LYRICA) 50 MG capsule Take 50 mg by mouth 2 times daily 12/3/2023 at 0758    rosuvastatin (CRESTOR) 20 MG tablet Take 20 mg by mouth every morning 12/3/2023 at 0758

## 2023-12-04 NOTE — PROCEDURES
Urgent arterial and TLC placed under US guidance    Right groin prepped and draped in sterile fashion. Skin and subcutaneous injected with lidocaine. US guidance     Right femoral TLC placed via seldinger technique with excellent blood return. Line secured with sutures    Right femoral arterial line placed under US guidance and sutured in place. Excellent waveform and blood return.    Patient tolerated procedures well    Michelle Trivedi MD

## 2023-12-04 NOTE — PLAN OF CARE
Pt arrived to  ~ 2300, with decreased mentation.  Bolus  and antibiotics administered, pt mentation improved, was A&OX4, able to respond to assessment questions. Complained of bilateral shoulder pain, turned and repositioned. Tylenol given for rectal temp 103, 4 L via oxymask, tachypneic, labored breathing.  Tele- 100% V-Paced. Pang in place.Pt developed rigor and chills, demerol given IV. Mottling noted on LE,bilateral upper extremity bruises.     ~0450, RRT called for hypotension. Pt transferred to ICU, belonging handed back to pt.

## 2023-12-04 NOTE — CODE STATUS AND ADVANCE DIRECTIVES
Brief house ALICE note    Rapid response called at  0448 am for hypotension.  BP 67/29.    Patient is well-known to me this evening.  Suffering from severe sepsis.  Was transferred to the IMC unit earlier this evening for more frequent vital signs.  Now with worsening mentation and hypotension. Minimally responsive but able to lift head off the pillow. Skin mottling deeper in color.     Septic shock progressing to multiorgan failure  Lactic acidosis  Streptococcus bacteremia  Urinary tract infection  Acute kidney injury  - Transferred to ICU  - Consult Intensivist for septic shock  - Stat additional 500 mL NS bolus (totaling 4 L of crystalloid in the last 24 hours)  - Stat peripheral Levophed  - Patient's or both called and voicemail's were left.    -- Patient's son Ruy returned call and was updated on events overnight.  I expressed my concern for his father's multiorgan failure and now septic shock including this may be the end of his life.  Unfortunately, Ruy is currently out of state and plans to get on the next flight home with the goal of getting to the bedside.  Per son Ruy okay with central line and arterial line with life supporting vasopressors until his arrival.  Confirms DNR/DNI. patient's other son Braxton lives locally and voicemail's have been left however no return call as of yet.    Carly Reed NP  [facility]  Securely message with the [employer] Web Console (learn more here)  Text page via [employer] Paging/Directory    Recent Labs   Lab 12/04/23  0441   PHV 7.19*   PO2V 24*   PCO2V 51*   HCO3V 19*     Recent Labs   Lab 12/04/23  0441 12/03/23  2235   PH  --  7.40   PHV 7.19*  --    PO2  --  79*   PO2V 24*  --    PCO2  --  32*   PCO2V 51*  --    HCO3  --  20*   HCO3V 19*  --      Recent Labs   Lab 12/04/23  0441 12/04/23  0058 12/03/23  2226 12/03/23  1502 11/30/23  0808     --   --  140 141   POTASSIUM 2.8*  --   --  3.9 3.1*   CHLORIDE 112*  --   --  104 109*   CO2  17*  --   --  24 24   ANIONGAP 14  --   --  12 8   * 102* 128* 183* 91   BUN 26.6*  --   --  26.3* 20.6   CR 1.38*  --   --  1.04 0.83   GFRESTIMATED 53*  --   --  74 90   ADIS 8.5*  --   --  9.8 8.6*   MAG  --   --   --  1.9  --    PROTTOTAL 5.0*  --   --  6.4 5.7*   ALBUMIN 2.5*  --   --  3.3* 3.2*   BILITOTAL 0.4  --   --  0.5 0.4   ALKPHOS 64  --   --  89 83   AST 21  --   --  20 25   ALT 22  --   --  29 39     Recent Labs   Lab 12/04/23  0441 12/04/23  0016 12/03/23  1502   LACT 4.2* 2.9* 1.8     I spent 30 minutes (4043- 3893) of critical care time on the unit/floor managing the care of Shaan Weems. Upon evaluation, this patient had a high probability of imminent or life-threatening deterioration due to septic shock, which required my direct attention, intervention, and personal management including life saving interventions. 100% of my time was spent at the bedside counseling the patient and/or coordinating care regarding services listed in this note.

## 2023-12-04 NOTE — PROGRESS NOTES
Admission    Patient arrives to room 633 via cart from ER.      Inpatient nursing criteria listed below were met:    Did you put disposition on whiteboard and in sticky note: Yes  Full skin assessment done (add LDA if skin issue present). Initials of 2nd RN, CD, RN.:Yes  Isolation education started/completed No  Patient allergies verified with patient: Yes  Fall Risk? (Care plan updated, Education given and documented) Yes  Primary Care Plan initiated: Yes  Home medications documented in belongings flowsheet: No  Patient belongings documented in belongings flowsheet: Yes  Reminder note (belongings/ medications) placed in discharge instructions:Yes  Admission profile/ required documentation complete: No  If patient is a 72 hour hold/Commitment are belongings removed from room and locked up? NA

## 2023-12-04 NOTE — PHARMACY-VANCOMYCIN DOSING SERVICE
"Pharmacy Vancomycin Initial Note  Date of Service December 3, 2023  Patient's  1945  78 year old, male    Indication: Sepsis    Current estimated CrCl = Estimated Creatinine Clearance: 68.7 mL/min (based on SCr of 1.04 mg/dL).    Creatinine for last 3 days  12/3/2023:  3:02 PM Creatinine 1.04 mg/dL    Recent Vancomycin Level(s) for last 3 days  No results found for requested labs within last 3 days.      Vancomycin IV Administrations (past 72 hours)        No vancomycin orders with administrations in past 72 hours.                    Nephrotoxins and other renal medications (From now, onward)      Start     Dose/Rate Route Frequency Ordered Stop    23 2300  piperacillin-tazobactam (ZOSYN) 4.5 g vial to attach to  mL bag        Note to Pharmacy: For [facility], [facility] and [facility]: For Zosyn-naive patients, use the \"Zosyn initial dose + extended infusion\" order panel.    4.5 g  over 30 Minutes Intravenous EVERY 6 HOURS 23 2300  vancomycin (VANCOCIN) 2,000 mg in 0.9% NaCl 500 mL intermittent infusion         2,000 mg  over 2 Hours Intravenous ONCE 23 2231              Contrast Orders - past 72 hours (72h ago, onward)      Start     Dose/Rate Route Frequency Stop    23 1710  iopamidol (ISOVUE-370) solution 91 mL         91 mL Intravenous ONCE 23 1712            [employer] Prediction of Planned Initial Vancomycin Regimen    Loading dose: 2000 mg  Regimen: 1500 mg IV every 24 hours.  Start time: 22:32 on 2023  Exposure target: AUC24 (range)400-600 mg/L.hr   AUC24,ss: 479 mg/L.hr  Probability of AUC24 > 400: 71 %  Ctrough,ss: 13.8 mg/L  Probability of Ctrough,ss > 20: 16 %  Probability of nephrotoxicity (Lodise ALLA ): 9 %          Plan:  Give vancomycin 2000 mg loading dose. Then continue vancomycin  1500 mg IV q24h.   Vancomycin monitoring method: AUC  Vancomycin therapeutic monitoring goal: 400-600 mg*h/L  Pharmacy will check vancomycin levels as appropriate in " 1-3 Days.    Serum creatinine levels will be ordered daily for the first week of therapy and at least twice weekly for subsequent weeks.      MORGAN ORTIZ RPH

## 2023-12-04 NOTE — CODE/RAPID RESPONSE
[facility]    House ALICE RRT Note  12/3/2023   Time Called: 22:16 pm     RRT called for: decreased LOC and fever     Mr. Weems is a 78-year-old male who presented from his care facility earlier this evening with urinary tract infection and infectious encephalopathy.  Patient had just arrived to the medical unit when he had a Tmax of 102.8 axillary and decreased LOC prompting RRT evaluation.    Assessment & Plan   Severe sepsis   Urinary tract infection  Hypotension - 91/55  Fever - 102.8  Acute hypoxic respiratory failure likely secondary to demand  Infectious encephalopathy  On my arrival Mr. Weems is lying supine in hospital bed, appears pale and lethargic.  Shortly flutters his eyes open to voice but unsustained.  He is able to wiggle bilateral toes and able to show thumbs up bilaterally to command.  Mucous membranes very dry.  Skin is dry and warm.  His bilateral lower extremities are mottled from toes to knees and bilateral upper extremities ecchymotic/ashanti.  Normal S1-S2.  Diminished lung sounds bilaterally.  He is newly requiring 4 L of supplemental oxygen when compared to room air on admission a few hours prior.    Please refer to admission H&P from this evening for full infectious workup details.  Ultimately source of sepsis is likely UTI.    Found to be fluid responsive during RRT evaluation.  Has received 3 L from ED thus far for his 30 mL/kg crystalloid resuscitation per sepsis guidelines.  If patient continues to have hypotension could consider NICOM assessment.    INTERVENTIONS:  -Acetaminophen as needed for fever  -LA, repeat blood cultures, ABG  -Pharmacy to dose vancomycin  -Continue previously prescribed Zosyn  -Follow urine culture  -500 mL NS bolus   -Continue normal saline at 100 mL/h  -Transfer pt to [facility] for acute 2-hour vital signs and continuous telemetry monitoring overnight  -Place burden catheter for strict intake and output  -Vascular access consult for  midline placement, patient is a very difficult IV placement  --VAT to place foot IV for IVF only, nursing staff to administer meds through LUE PIV with plans for midline in the am.   -N.p.o. status overnight until mentation clears, daytime providers to reevaluate  -Hold Imdur, Remeron, and Lyrica given hypotension and encephalopathy   -Echo as previously ordered.  -Called son's Braxton and Ruy leaving voicemail's for both with no return call      Working diagnosis: Sepsis secondary to urinary tract infection    At the end of the RRT patient was transferred to the heart [facility] on monitor with critical care nursing staff without acute event.  His temperature has improved to 100 degrees axillary.  He remains hemodynamically stable.  His mentation is mildly improving fluttering his eyes open.  He continues to sufficiently protect his airway.    Disposition heart center on [facility] status    defer further cares to hospitalist attending physician Dr. Peres    ADDENDUM 0100 am: I was called to evaluate Mr. Weems. Skin mottling has worsened to bilateral lower extremities, inner thighs and posterior buttock. Rectal temp 103.0 now. Rigors and chills returned. Increased oxygen to 5L oxymask (difficult to obtain sat however NICU probe reading to index finger. Hemodynamically stable. Mentation remains lethargic however improved from earlier in the evening. Able to state name and nod head to questions. Opens eyes to voice and follows commands weakly.     -administer dose of prn rectal tylenol   -Demerol 25 mg q 2hrs for chills and rigors (PTA Cymbalta and Remeron on hold given med increased effects)   -Hold continuous main IV fluid while vancomycin is infusing   -phlebotomy difficulty obtaining peripheral cultures, requested RN not to delay abx, lab to draw.     No charge addendum.     Interval History     Past medical history significant for Persistent atrial fib s/p AV node ablation and PPM implantation, Chronic anticoagulation  therapy with Eliquis, CAD, HTN, HLP, DM2, MDD with anxiety, Insomnia, Urge urinary incontinence, Chronic pain syndrome, Chronic anemia, History of Prostate cancer, History of Thyroid cancer, History of SAH secondary to a fall who was admitted to [facility] due to sepsis secondary to urinary tract infection with infectious encephalopathy.     Code Status: No CPR- Do NOT Intubate    Allergies   Allergies   Allergen Reactions    Morphine Nausea and Vomiting    Hydromorphone        Physical Exam   Vital Signs with Ranges:  Temp:  [99  F (37.2  C)-102.8  F (39.3  C)] 102.8  F (39.3  C)  Pulse:  [] 80  Resp:  [14-35] 34  BP: ()/() 91/55  SpO2:  [91 %-98 %] 98 %  No intake/output data recorded.    Physical Exam  Constitutional:       Appearance: He is obese. He is ill-appearing. He is not diaphoretic.   HENT:      Mouth/Throat:      Mouth: Mucous membranes are dry.   Eyes:      Conjunctiva/sclera:      Right eye: Exudate present.      Left eye: Exudate present.      Pupils: Pupils are equal, round, and reactive to light.   Cardiovascular:      Rate and Rhythm: Normal rate and regular rhythm.      Pulses: Normal pulses.      Heart sounds: Normal heart sounds. No murmur heard.  Pulmonary:      Effort: Pulmonary effort is normal. No respiratory distress.      Breath sounds: Normal breath sounds.   Abdominal:      General: Abdomen is protuberant. Bowel sounds are normal.      Palpations: Abdomen is soft.      Tenderness: There is no abdominal tenderness.   Genitourinary:     Pubic Area: Rash present.   Skin:     General: Skin is warm.      Capillary Refill: Capillary refill takes 2 to 3 seconds.      Coloration: Skin is mottled and pale.      Findings: Ecchymosis present.      Comments: Tru bilateral upper extremities with ecchymosis, mottled knees to toes bilateral lower extremities, warm to the touch.  Groin incontinence rash.   Neurological:      Mental Status: He is lethargic.         Data    ABG:    Recent Labs   Lab 12/03/23  2235   PH 7.40   PCO2 32*   PO2 79*   HCO3 20*   O2PER 36       IMAGING: (X-ray/CT/MRI)   Recent Results (from the past 24 hour(s))   Chest XR,  PA & LAT    Narrative    EXAM: XR CHEST 2 VIEWS  LOCATION: [facility]  DATE: 12/3/2023    INDICATION: Low blood pressure.  COMPARISON: None.      Impression    IMPRESSION:    Lung volumes are low, with associated hypoventilatory changes. No focal airspace opacities, pleural effusions, or pneumothorax.    Enlarged cardiomediastinal silhouette, likely accentuated by the low lung volumes. Left subclavian approach dual lead pacemaker with lead tips over the right atrium and right ventricle.   CT Abdomen Pelvis w Contrast    Narrative    EXAM: CT ABDOMEN PELVIS W CONTRAST  LOCATION: [facility]  DATE: 12/3/2023    INDICATION: low bp  mottled  COMPARISON: 11/29/2023  TECHNIQUE: CT scan of the abdomen and pelvis was performed following injection of IV contrast. Multiplanar reformats were obtained. Dose reduction techniques were used.  CONTRAST: 91mL Isovue 370    FINDINGS:   LOWER CHEST: No acute findings    HEPATOBILIARY: Unremarkable    PANCREAS: Unremarkable    SPLEEN: Scattered calcified granulomas    ADRENAL GLANDS: Unremarkable    KIDNEYS/BLADDER: Redemonstrated renal cysts. No hydronephrosis.    BOWEL: No obstruction or inflammatory change. Prior gastric fundoplication.    LYMPH NODES: Normal.    VASCULATURE: The portal, superior mesenteric, and splenic veins are patent. The abdominal aorta is normal in caliber.    PELVIC ORGANS: Fiducial markers in the prostate.    MUSCULOSKELETAL: Unremarkable      Impression    IMPRESSION:   1.  No acute findings in the abdomen or pelvis.       CBC with Diff:  Recent Labs   Lab Test 12/03/23  1502   WBC 12.8*   HGB 9.4*              Lactic Acid:    Recent Labs   Lab 12/03/23  1502 11/29/23  1600 11/29/23  1341   LACT 1.8 1.4 2.5*          Comprehensive Metabolic Panel:  Recent Labs   Lab 12/03/23  2226 12/03/23  1502   NA  --  140   POTASSIUM  --  3.9   CHLORIDE  --  104   CO2  --  24   ANIONGAP  --  12   * 183*   BUN  --  26.3*   CR  --  1.04   GFRESTIMATED  --  74   ADIS  --  9.8   MAG  --  1.9   PROTTOTAL  --  6.4   ALBUMIN  --  3.3*   BILITOTAL  --  0.5   ALKPHOS  --  89   AST  --  20   ALT  --  29       UA:  Recent Labs   Lab 12/03/23  1653   COLOR Straw   APPEARANCE Slightly Cloudy*   URINEGLC Negative   URINEBILI Negative   URINEKETONE Negative   SG 1.014   UBLD Moderate*   URINEPH 6.0   PROTEIN 70*   NITRITE Positive*   LEUKEST Moderate*   RBCU 16*   WBCU 55*       Time Spent on this Encounter   I spent 60 minutes (7270 [phone] 9215) of critical care time on the unit/floor managing the care of Shaan Weems. Upon evaluation, this patient had a high probability of imminent or life-threatening deterioration due to severe sepsis, which required my direct attention, intervention, and personal management including life saving interventions. 100% of my time was spent at the bedside counseling the patient and/or coordinating care regarding services listed in this note.    Caryl Reed NP  [facility]  Securely message with the Vocera Web Console (learn more here)  Text page via [employer] Paging/Directory

## 2023-12-04 NOTE — PROGRESS NOTES
Henrique's test performed prior to radial ABG draw. Collateral circulation confirmed.    Site:Right radial  FiO2: 36%  Device: oxymask      Abimael Gardner, RT on 12/3/2023 at 10:55 PM

## 2023-12-04 NOTE — PROVIDER NOTIFICATION
MD Notification    Notified Person: MD    Notified Person Name: Zina     Notification Date/Time:12/4/23 0255    Notification Interaction:vocera    Purpose of Notification:Critical lab: Blood culture Gram positive cocci and pair in chains.  Pt is already on Vanco and rocephin. Please advice, thanks  Orders Received:    Comments:

## 2023-12-04 NOTE — PROGRESS NOTES
ADDENDUM/UPDATE:    Paged regarding fever of 102.8 and requiring supplemental O2 with 5 L oxymask.  He has had decreased interactions as well and overall concern for developing/progressing Sepsis.    --Advised that an RRT be called.    --Reviewed with House ALICE (Carly Reed CNP) in the room at the start of the RRT.      Severo Gonzales PA-C  [facility]  Securely message with the [employer] Web Console (learn more here)  Text page via bluebird [employer] Paging/Directory

## 2023-12-04 NOTE — PLAN OF CARE
Goal Outcome Evaluation:  Plan of Care Reviewed With: patient    Overall Patient Progress: no changeOverall Patient Progress: no change    Outcome Evaluation: Pt lethargic, but able to answer orientation questions approprietly. PERRL. Reports pain in bilat upper extremities and difficulty with movement due to existing blisters between the left fingers. Lung sounds diminished, clear. Pang intact with adequate urine output. Femoral central line place and Art line in R groin for BP management. Pt needing increased amount of Levo, currently at 0.12. MD notified. No further intervention at this time. Will continue with pt plan of care.    Problem: Adult Inpatient Plan of Care  Goal: Plan of Care Review  Description: The Plan of Care Review/Shift note should be completed every shift.  The Outcome Evaluation is a brief statement about your assessment that the patient is improving, declining, or no change.  This information will be displayed automatically on your shift  note.  Outcome: Progressing  Flowsheets (Taken 12/4/2023 0810)  Outcome Evaluation: Pt lethargic, but able to answer orientation questions approprietly. PERRL. Reports pain in bilat upper extremities and difficulty with movement due to existing blisters between the left fingers. Lung sounds diminished, clear. Pang intact with adequate urine output. Femoral central line place and Art line in R groin for BP management. Pt needing increased amount of Levo, currently at 0.12. MD notified. No further intervention at this time. Will continue with pt plan of care.  Plan of Care Reviewed With: patient  Overall Patient Progress: no change

## 2023-12-04 NOTE — PROGRESS NOTES
[facility]   CRITICAL CARE NOTE     Shaan Weems MRN: [id number]  1945  Date of Admission:12/3/2023          Problem List:   1. Septic shock  2. Encephalopathy   3. Hypoxemic respiratory distress     24-Hour Goals   1. Vasopressor optimization MAP>60  2. Titrate O2 SpO2>90%      Overnight Events   No acute issues overnight       Lines/Tubes/Draines/Devices   .  Peripheral IV 12/03/23 Left Antecubital fossa (Active)   Site Assessment [facility] 12/04/23 0800   Line Status Infusing 12/04/23 0800   Dressing Status clean;dry;intact 12/04/23 0800   Number of days: 1       Peripheral IV 12/03/23 Anterior;Right Foot (Active)   Site Assessment [facility] 12/04/23 0800   Line Status Saline locked 12/04/23 0800   Dressing Transparent 12/03/23 2300   Dressing Status clean;dry;intact 12/04/23 0800   Dressing Intervention New dressing  12/03/23 2300   Line Intervention Cap change;Flushed 12/03/23 2300   Phlebitis Scale 0-->no symptoms 12/03/23 2300   Infiltration? no 12/03/23 2300   Number of days: 1       Arterial Line 12/04/23 Femoral (Active)   Site Assessment [facility] 12/04/23 0800   Line Status Pulsatile blood flow 12/04/23 0800   Arterine Line Cap Change Due 12/11/23 12/04/23 0600   Art Line Waveform Appropriate 12/04/23 0600   Art Line Interventions Zeroed and calibrated;Tubing changed;Connections checked and tightened;Flushed per protocol 12/04/23 0600   Line Necessity Yes, meets criteria 12/04/23 0800   Dressing Type Transparent 12/04/23 0800   Dressing Status Clean, dry, intact 12/04/23 0800   Dressing Intervention Dressing changed/new dressing 12/04/23 0800   Dressing Change Due 12/11/23 12/04/23 0600   Number of days: 0       CVC Right Femoral (Active)   Site Assessment [facility] 12/04/23 0800   Dressing Transparent 12/04/23 0800   Dressing Status clean;dry;intact 12/04/23 0800   Dressing Intervention dressing changed 12/04/23 0600   Dressing Change Due 12/11/23 12/04/23 0600   Line Necessity Yes, meets criteria  12/04/23 0600   Phlebitis Scale 0-->no symptoms 12/04/23 0600   Infiltration? no 12/04/23 0600   Number of days: 0       Urethral Catheter 12/03/23 Coude (Active)   Tube Description Positional 12/03/23 2329   Catheter Care Done;Catheter wipes 12/04/23 0800   Collection Container Standard 12/04/23 0800   Securement Method Securing device (Describe) 12/04/23 0800   Rationale for Continued Use Strict 1-2 Hour I&O 12/04/23 0800   Urine Output 325 mL 12/04/23 0653   Number of days: 1       Wound Arm Skin tear (Active)   Wound Bed Bleeding;Moist 12/04/23 0600   Jasmyne-wound Assessment Ecchymosis;Dusky 12/04/23 0600   Drainage Amount Moderate 12/04/23 0600   Wound Care/Cleansing Normal saline 12/04/23 0600   Dressing Foam 12/04/23 0600   Dressing Status Changed 12/04/23 0600   Number of days: 0       Wound Hand Other (comment) (Active)   Wound Bed Blister- blood filled;Blister-Serous 12/04/23 0600   Jasmyne-wound Assessment Ecchymosis;Edema;Dusky 12/04/23 0600   Drainage Amount None 12/04/23 0600   Dressing Foam 12/04/23 0600   Number of days: 0       Wound Sacrum Pressure injury community acquired (Active)   Dressing Foam 12/04/23 0600   Number of days: 0          ICU Prophylaxis:   1. DVT: Hep Subq/ LMWH/mechanical  2. VAP: HOB 30 degrees, chlorhexidine rinse  3. Stress Ulcer: PPI/H2 blocker  4. Restraints: Nonviolent soft two point restraints required and necessary for patient safety and continued cares and good effect as patient continues to pull at necessary lines, tubes despite education and distraction. Will readdress daily.   5. Wound care  -   6. Feeding - NPO  7. Family Update:NP updated family  8. Disposition - gaurded      Medications:      abiraterone  1,000 mg Oral QAM    apixaban ANTICOAGULANT  5 mg Oral BID    [Held by provider] DULoxetine  30 mg Oral BID    insulin aspart  1-6 Units Subcutaneous Q4H    [Held by provider] isosorbide mononitrate  30 mg Oral Daily    [Held by provider] mirtazapine  7.5 mg Oral At  Bedtime    piperacillin-tazobactam  4.5 g Intravenous Q6H    potassium chloride  20 mEq Intravenous Q1H    [Held by provider] pregabalin  50 mg Oral BID    rosuvastatin  20 mg Oral QAM    senna-docusate  1 tablet Oral BID    Or    senna-docusate  2 tablet Oral BID    sodium chloride (PF)  3 mL Intracatheter Q8H    sodium chloride 0.9%  500 mL Intravenous Once    sodium phosphate  15 mmol Intravenous Once    vancomycin place nuñez - receiving intermittent dosing  1 each Intravenous See Admin Instructions       Review of Systems:   Unable to obtain due to critical illness          Physical Exam:   Temp:  [98.6  F (37  C)-103  F (39.4  C)] 98.7  F (37.1  C)  Pulse:  [] 84  Resp:  [11-37] 23  BP: ()/() 115/58  MAP:  [0 mmHg-66 mmHg] 63 mmHg  Arterial Line BP: (1-96)/(-16-55) 91/48  SpO2:  [79 %-100 %] 98 %    Intake/Output Summary (Last 24 hours) at 12/4/2023 0928  Last data filed at 12/4/2023 0800  Gross per 24 hour   Intake 1787.59 ml   Output 325 ml   Net 1462.59 ml     Wt Readings from Last 4 Encounters:   12/03/23 81.6 kg (180 lb)   11/29/23 81.6 kg (180 lb)     Arterial Line BP: (1-96)/(-16-55) 91/48  MAP:  [0 mmHg-66 mmHg] 63 mmHg  BP - Mean:  [] 76  Resp: 23    Recent Labs   Lab 12/04/23  0637 12/04/23  0441 12/03/23  2235   PH 7.35  --  7.40   PCO2 31*  --  32*   PO2 115*  --  79*   HCO3 17*  --  20*   O2PER 44 5 36       GEN: resp distress   HEENT: head ncat, sclera anicteric, OP patent, trachea midline   PULM: labored, scattered rales, clear anteriorly    CV/COR: RRR S1S2 no gallop,  No rub, no murmur  ABD: soft nontender, hypoactive bowel sounds, no mass  EXT:  Edema   warm  NEURO: grossly intact  SKIN: no obvious rash      Assessment and plan :     Shaan Weems IS a 78 year old male admitted on 12/3/2023 for urosepsis.      Neurology/Psychiatry:   Encephalopathy/Delerium likely from sepsis. Expectant management.      Cardiovascular:   Septic shock, hypotensive on NE pressors  CAD    Persistent AFib s/p PPM on Eliquis  Plan  Pressors for septic shock. May need to add vasopressin today. Add stress dose steroids.   TKO IVF given degree of O2 requirement and DNI status  Ok to continue eliquis     Pulmonary/Ventilator Management:   1. Airway patent-on FM O2 sats 95%  2. Oxygenation/ventilation/ DNR-lungs clear  Plan  - CXR  -pulm toilet     GI and Nutrition :   1. Protein deficit malnutrition (Albumin <3)  Plan  -NPO for now until stabilized     Renal/Fluids/Electrolytes:   1. MARCELA and hypervolemia. SCr a bit worse this morning. TKO IVF given respiratory status. Monitor UOP.      Infectious Disease:   1. Bactermia (2/2 bottles + GPC in chains and pairs) presumed 2/2 urine source. CT a/p with no hydronephrosis or other acute etiologies. Given increasing pressor requirements, will add clindamycin for toxin suppression ?TSS.      Endocrine:   1. S/p thyroidectomy  2. Stress induced hyperglycemia    Oncology  H/o prostate cancer on abiraterone. Holding abiraterone given severe septic shock.  Plan  - ICU insulin protocol, goal sugar <180  -Levothyroxine     Hematology/Oncology:   1. Hgb 7.8 (down from 9.4)  2.  Anemia, no signs, symptoms of active blood loss  Plan  - continue to monitor      IV/Access:   1. Venous access - TLC right groin  2. Arterial access - right groin  3.  Plan  - central access required and necessary       Disposition/Code Status/Other  1. Critically ill  2. Code: DNR    I am managing these acute and ongoing critical issues resulting in critical condition that impairs one or more vital organ systems, incur a high probability of imminent or life-threatening deterioration in the patient's condition and providing frequent personal assessment and manipulation of medications and life support equipment.     I have discussed the patient in detail and I agree with the findings, assessment, and plan as documented when this note was signed/cosigned on this day. The plan was  "formulated in conjunction with pharmacy, ICU nurses, and respiratory therapist. I have evaluated all laboratory values and imaging studies for the past 24 hours. I have reviewed all the consults that have been ordered and are active for this patient.      Critical Care Time: 30 min.  I spent this time (excluding procedures) personally providing and directing critical care services at the bedside and on the critical care unit.      Tarik Swain MD  Associate Professor of Medicine  Section of Interventional Pulmonology   Division of Pulmonary, Allergy, Critical Care and Sleep Medicine   [facility], [address] [employer]  Pager: [phone]   Office: [phone]  Email: [email or web address]    @@@      Data:   All data and imaging reviewed    Clinically Significant Risk Factors Present on Admission        # Hypokalemia: Lowest K = 2.8 mmol/L in last 2 days, will replace as needed    # Hypercalcemia: corrected calcium is >10.1, will monitor as appropriate    # Hypoalbuminemia: Lowest albumin = 2.5 g/dL at 12/4/2023  4:41 AM, will monitor as appropriate  # Drug Induced Coagulation Defect: home medication list includes an anticoagulant medication   # Acute Kidney Injury, unspecified: based on a >150% or 0.3 mg/dL increase in last creatinine compared to past 90 day average, will monitor renal function    # Circulatory Shock: required vasopressors within past 24 hours    # Acute Respiratory Failure: Documented O2 saturation < 91%.  Continue supplemental oxygen as needed    # DMII: A1C = 6.6 % (Ref range: <5.7 %) within past 6 months    # Overweight: Estimated body mass index is 25.83 kg/m  as calculated from the following:    Height as of this encounter: 1.778 m (5' 10\").    Weight as of this encounter: 81.6 kg (180 lb).       # Financial/Environmental Concerns:        # Anemia: based on hgb <11   Code Status: No CPR- Do NOT Intubate                           "

## 2023-12-04 NOTE — ED NOTES
[facility]  ED Nurse Handoff Report    ED Chief complaint: UTI      ED Diagnosis:   Final diagnoses:   Sepsis, due to unspecified organism, unspecified whether acute organ dysfunction present (H)   Urinary tract infection without hematuria, site unspecified   Constipation, unspecified constipation type   Elevated troponin       Code Status: Full Code    Allergies:   Allergies   Allergen Reactions    Morphine Nausea and Vomiting    Hydromorphone        Patient Story:  presents from the nursing home because of low blood pressures and bad smelling urine.  He had a blood pressure in the 70s and another in the 80s this morning and he seemed ill.  Patient just had been admitted last week for feeling ill but had no acute findings and was discharge   Focused Assessment:  foul smelling urine and lethargic    Treatments and/or interventions provided:   Labs Ordered and Resulted from Time of ED Arrival to Time of ED Departure   COMPREHENSIVE METABOLIC PANEL - Abnormal       Result Value    Sodium 140      Potassium 3.9      Carbon Dioxide (CO2) 24      Anion Gap 12      Urea Nitrogen 26.3 (*)     Creatinine 1.04      GFR Estimate 74      Calcium 9.8      Chloride 104      Glucose 183 (*)     Alkaline Phosphatase 89      AST 20      ALT 29      Protein Total 6.4      Albumin 3.3 (*)     Bilirubin Total 0.5     CBC WITH PLATELETS AND DIFFERENTIAL - Abnormal    WBC Count 12.8 (*)     RBC Count 3.05 (*)     Hemoglobin 9.4 (*)     Hematocrit 30.6 (*)           MCH 30.8      MCHC 30.7 (*)     RDW 14.6      Platelet Count 188      % Neutrophils 92      % Lymphocytes 2      % Monocytes 5      % Eosinophils 0      % Basophils 0      % Immature Granulocytes 1      NRBCs per 100 WBC 0      Absolute Neutrophils 11.7 (*)     Absolute Lymphocytes 0.3 (*)     Absolute Monocytes 0.6      Absolute Eosinophils 0.0      Absolute Basophils 0.0      Absolute Immature Granulocytes 0.1      Absolute NRBCs 0.0     ROUTINE UA  WITH MICROSCOPIC REFLEX TO CULTURE - Abnormal    Color Urine Straw      Appearance Urine Slightly Cloudy (*)     Glucose Urine Negative      Bilirubin Urine Negative      Ketones Urine Negative      Specific Gravity Urine 1.014      Blood Urine Moderate (*)     pH Urine 6.0      Protein Albumin Urine 70 (*)     Urobilinogen Urine Normal      Nitrite Urine Positive (*)     Leukocyte Esterase Urine Moderate (*)     Bacteria Urine Few (*)     RBC Urine 16 (*)     WBC Urine 55 (*)     Granular Casts Urine 3 (*)    TROPONIN T, HIGH SENSITIVITY - Abnormal    Troponin T, High Sensitivity 150 (*)    LACTIC ACID WHOLE BLOOD - Normal    Lactic Acid 1.8     INFLUENZA A/B, RSV, & SARS-COV2 PCR - Normal    Influenza A PCR Negative      Influenza B PCR Negative      RSV PCR Negative      SARS CoV2 PCR Negative     BLOOD CULTURE   BLOOD CULTURE   URINE CULTURE      CT Abdomen Pelvis w Contrast   Final Result   IMPRESSION:    1.  No acute findings in the abdomen or pelvis.      Chest XR,  PA & LAT   Final Result   IMPRESSION:      Lung volumes are low, with associated hypoventilatory changes. No focal airspace opacities, pleural effusions, or pneumothorax.      Enlarged cardiomediastinal silhouette, likely accentuated by the low lung volumes. Left subclavian approach dual lead pacemaker with lead tips over the right atrium and right ventricle.         Medications   sodium chloride 0.9% BOLUS 1,000 mL (0 mLs Intravenous Stopped 12/3/23 1655)   sodium chloride 0.9% BOLUS 1,000 mL (1,000 mLs Intravenous $New Bag 12/3/23 1730)   piperacillin-tazobactam (ZOSYN) 4.5 g vial to attach to  mL bag (4.5 g Intravenous $New Bag 12/3/23 1730)   Saline (66 mLs As instructed $Given 12/3/23 1712)   iopamidol (ISOVUE-370) solution 91 mL (91 mLs Intravenous $Given 12/3/23 1712)      Patient's response to treatments and/or interventions: tolearted     To be done/followed up on inpatient unit:  na    Does this patient have any cognitive  "concerns?:  na    Activity level - Baseline/Home:  Unknown  Activity Level - Current:   Unknown    Patient's Preferred language: English   Needed?: No    Isolation: None  Infection: Not Applicable  Patient tested for COVID 19 prior to admission: YES  Bariatric?: No    Vital Signs:   Vitals:    12/03/23 1457 12/03/23 1510 12/03/23 1726 12/03/23 1800   BP: 136/75  (!) 198/110 (!) 201/104   Pulse: 73  100 87   Resp: 22   18   Temp: 99  F (37.2  C)      TempSrc: Oral      SpO2: 94%   96%   Weight:  81.6 kg (180 lb)     Height:  1.778 m (5' 10\")         Cardiac Rhythm:     Was the PSS-3 completed:   Yes  What interventions are required if any?               Family Comments:   OBS brochure/video discussed/provided to patient/family: Yes              Name of person given brochure if not patient:               Relationship to patient:     For the majority of the shift this patient's behavior was Green.   Behavioral interventions performed were .    ED NURSE PHONE NUMBER: [phone]         "

## 2023-12-04 NOTE — PROGRESS NOTES
RECEIVING UNIT ED HANDOFF REVIEW    ED Nurse Handoff Report was reviewed by: Raphael Jain RN on December 3, 2023 at 8:42 PM

## 2023-12-04 NOTE — ED PROVIDER NOTES
"    History     Chief Complaint:  UTI       HPI   Shaan Weems is a 78 year old male who presents from the nursing home because of low blood pressures and bad smelling urine.  He had a blood pressure in the 70s and another in the 80s this morning and he seemed ill.  Patient just had been admitted last week for feeling ill but had no acute findings and was discharged      Independent Historian:    Patient    Review of External Notes:  Nursing home notes    Medications:    abiraterone (ZYTIGA) 250 MG tablet  acetaminophen (TYLENOL) 325 MG tablet  apixaban ANTICOAGULANT (ELIQUIS) 5 MG tablet  cholecalciferol (VITAMIN D3) 25 mcg (1000 units) capsule  DULoxetine (CYMBALTA) 30 MG capsule  isosorbide mononitrate (IMDUR) 30 MG 24 hr tablet  melatonin 3 MG tablet  metFORMIN (GLUCOPHAGE XR) 500 MG 24 hr tablet  metoprolol succinate ER (TOPROL XL) 100 MG 24 hr tablet  mirtazapine (REMERON) 7.5 MG tablet  oxyBUTYnin (DITROPAN) 5 MG tablet  predniSONE (DELTASONE) 5 MG tablet  pregabalin (LYRICA) 50 MG capsule  rosuvastatin (CRESTOR) 20 MG tablet        Past Medical History:    No past medical history on file.    Past Surgical History:    No past surgical history on file.       Physical Exam   Patient Vitals for the past 24 hrs:   BP Temp Temp src Pulse Resp SpO2 Height Weight   12/03/23 1800 (!) 201/104 -- -- 87 18 96 % -- --   12/03/23 1726 (!) 198/110 -- -- 100 -- -- -- --   12/03/23 1510 -- -- -- -- -- -- 1.778 m (5' 10\") 81.6 kg (180 lb)   12/03/23 1457 136/75 99  F (37.2  C) Oral 73 22 94 % -- --        Physical Exam  Constitutional: Elderly white male supine who appears uncomfortable no obvious respiratory distress oropharynx dry  HENT: No signs of trauma.   Eyes: EOM are normal. Pupils are equal, round, and reactive to light.   Neck: Normal range of motion. No JVD present. No cervical adenopathy.  Cardiovascular: Regular rhythm.  Exam reveals no gallop and no friction rub.    No murmur heard.  Pulmonary/Chest: A few " rales noted at bases.  Pacemaker left chest   abdominal: Firm rectal reveals firm brown stool no tenderness. No rebound or guarding.   Musculoskeletal: No edema. No tenderness.  Hematoma right antecubital region lethargic  Lymphadenopathy: No lymphadenopathy.   Neurological: Lethargic does answer questions and assist with exam moves all extremities  Skin: Mottling of the lower chest through both legs.  Diffuse abrasions.  Emergency Department Course   ECG  Ventricular paced rhythm slightly irregular  Rate 85 bpm.   Number of allergies times number is over 30.  Imaging:  CT Abdomen Pelvis w Contrast   Final Result   IMPRESSION:    1.  No acute findings in the abdomen or pelvis.      Chest XR,  PA & LAT   Final Result   IMPRESSION:      Lung volumes are low, with associated hypoventilatory changes. No focal airspace opacities, pleural effusions, or pneumothorax.      Enlarged cardiomediastinal silhouette, likely accentuated by the low lung volumes. Left subclavian approach dual lead pacemaker with lead tips over the right atrium and right ventricle.        Report per radiologist    Laboratory:  Labs Ordered and Resulted from Time of ED Arrival to Time of ED Departure   COMPREHENSIVE METABOLIC PANEL - Abnormal       Result Value    Sodium 140      Potassium 3.9      Carbon Dioxide (CO2) 24      Anion Gap 12      Urea Nitrogen 26.3 (*)     Creatinine 1.04      GFR Estimate 74      Calcium 9.8      Chloride 104      Glucose 183 (*)     Alkaline Phosphatase 89      AST 20      ALT 29      Protein Total 6.4      Albumin 3.3 (*)     Bilirubin Total 0.5     CBC WITH PLATELETS AND DIFFERENTIAL - Abnormal    WBC Count 12.8 (*)     RBC Count 3.05 (*)     Hemoglobin 9.4 (*)     Hematocrit 30.6 (*)           MCH 30.8      MCHC 30.7 (*)     RDW 14.6      Platelet Count 188      % Neutrophils 92      % Lymphocytes 2      % Monocytes 5      % Eosinophils 0      % Basophils 0      % Immature Granulocytes 1      NRBCs per 100  WBC 0      Absolute Neutrophils 11.7 (*)     Absolute Lymphocytes 0.3 (*)     Absolute Monocytes 0.6      Absolute Eosinophils 0.0      Absolute Basophils 0.0      Absolute Immature Granulocytes 0.1      Absolute NRBCs 0.0     ROUTINE UA WITH MICROSCOPIC REFLEX TO CULTURE - Abnormal    Color Urine Straw      Appearance Urine Slightly Cloudy (*)     Glucose Urine Negative      Bilirubin Urine Negative      Ketones Urine Negative      Specific Gravity Urine 1.014      Blood Urine Moderate (*)     pH Urine 6.0      Protein Albumin Urine 70 (*)     Urobilinogen Urine Normal      Nitrite Urine Positive (*)     Leukocyte Esterase Urine Moderate (*)     Bacteria Urine Few (*)     RBC Urine 16 (*)     WBC Urine 55 (*)     Granular Casts Urine 3 (*)    TROPONIN T, HIGH SENSITIVITY - Abnormal    Troponin T, High Sensitivity 150 (*)    LACTIC ACID WHOLE BLOOD - Normal    Lactic Acid 1.8     INFLUENZA A/B, RSV, & SARS-COV2 PCR - Normal    Influenza A PCR Negative      Influenza B PCR Negative      RSV PCR Negative      SARS CoV2 PCR Negative     BLOOD CULTURE   BLOOD CULTURE   URINE CULTURE        Procedures   None    Emergency Department Course & Assessments:             Interventions:  Medications   sodium chloride 0.9% BOLUS 1,000 mL (0 mLs Intravenous Stopped 12/3/23 1655)   sodium chloride 0.9% BOLUS 1,000 mL (1,000 mLs Intravenous $New Bag 12/3/23 1730)   piperacillin-tazobactam (ZOSYN) 4.5 g vial to attach to  mL bag (4.5 g Intravenous $New Bag 12/3/23 1730)   Saline (66 mLs As instructed $Given 12/3/23 1712)   iopamidol (ISOVUE-370) solution 91 mL (91 mLs Intravenous $Given 12/3/23 1712)        Assessments:  Examined    Independent Interpretation (X-rays, CTs, rhythm strip):  Chest x-ray reviewed    Consultations/Discussion of Management or Tests:  Hospitalist       Social Determinants of Health affecting care:  Nursing home     Disposition:  Admit    Impression & Plan    CMS Diagnoses: None  Medical Decision  Making:  Elderly male sent from nursing home for evaluation of low blood pressure and foul-smelling urine patient is unable to give much history he appears ill and is mottled although here his blood pressure is normal.  Patient is found to have a urinary tract infection started on IV fluids and antibiotics his lactate is normal his white count is minimally elevated his chest x-ray and CT abdomen did not show any acute problems.  Son was here and I confirmed with him that patient is DNR/DNI we will admit for further evaluation and treatment.  In addition his troponin is elevated and his EKG shows a paced rhythm this is likely demand ischemia from his low blood pressure earlier today.  Interestingly enough patient had been hospitalized last week and at that time his urine was clear.        Diagnosis:    ICD-10-CM    1. Sepsis, due to unspecified organism, unspecified whether acute organ dysfunction present (H)  A41.9       2. Urinary tract infection without hematuria, site unspecified  N39.0       3. Constipation, unspecified constipation type  K59.00       4. Elevated troponin  R79.89            Discharge Medications:  New Prescriptions    No medications on file          Edu Basilio MD  12/3/2023   Edu Basilio MD Steinman, Randall Ira, MD  12/03/23 1091

## 2023-12-04 NOTE — PROGRESS NOTES
Transfer in/out    Transfer to [facility] from [facility]  Report given to/received from Laura García    Brief note about patient status upon transfer decrease consciousness. Respiration: 36. Needs more level of care and frequent assesment.      Code status: DNR / DNI  Skin: Lots of bruising, scab L shin, L arm bruise pass to elbow, blood blister L hand, scab on L hand, Skin tear R arm (blood blister) & scabs on R elbow.   Fall Risk: Yes- Department fall risk interventions implemented.  Isolation: None  Patient belongings: was sent to the Heart unit and the ring was sent to the security with slip.   If patient is a 72 hour hold/Commitment are belongings removed from room and locked up? NA  Medication drips upon transfer: NO  Sign and held orders released? Yes

## 2023-12-04 NOTE — PROGRESS NOTES
Patient is critically ill and now managed by critical care team. Hospitalists will sign off.  Please re contact the hospital team to resume care when appropriate.   Dr. Tatiana Peres

## 2023-12-04 NOTE — PROGRESS NOTES
Noted consult placed on off hours for possible midline. During chart review, found that pt had a triple lumen femoral CVC placed this morning. Spoke with bedside RN who confirmed that pt has adequate vascular access at this time. Will complete consult. Please place new consult if VAT services necessary.

## 2023-12-04 NOTE — PROGRESS NOTES
Hospitalist JESUS Reed okays lower extremity PIV, as patient with very limited options upper extremities.  Midline order deferred to next VAT shift, hospitalist aware.

## 2023-12-04 NOTE — PROGRESS NOTES
Critical Care  Note      12/04/2023    Name: Shaan Weems MRN#: [id number]   Age: 78 year old YOB: 1945     Hsptl Day# 1  ICU DAY #0    MV DAY #N/A             Problem List:   Principal Problem:    Elevated troponin  Active Problems:    Urinary tract infection without hematuria, site unspecified    Constipation, unspecified constipation type    Sepsis, due to unspecified organism, unspecified whether acute organ dysfunction present (H)           Summary/Hospital Course:    78-year-old male who presented from his care facility earlier 12/2 with UTI and associated encephalopathy. On IMC for 24 hours worsening hypotension, RRT called. NP confirmed with family DNR/DNI. Pressors ok. Transferred to ICU in septic shock (urosepsis)        Assessment and plan :     Shaan Weems IS a 78 year old male admitted on 12/3/2023 for urosepsis.   I have personally reviewed the daily labs, imaging studies, cultures and discussed the case with referring physician and consulting physicians.     My assessment and plan by system for this patient is as follows:    Neurology/Psychiatry:   Answering question appropriately, but lethargic  SIEGEL  Delerium  Plan  Pain management    Cardiovascular:   Septic shock, hypotensive on NE pressors  CAD   Persistent AFib s/p PPM on Eliquis  Plan   Pressors for septic shock  Given 4L NS on the floor prior to transfer    Pulmonary/Ventilator Management:   1. Airway patent-on FM O2 sats 95%  2. Oxygenation/ventilation/ DNR-lungs clear  Plan  - CXR  -pulm toilet    GI and Nutrition :   1. Protein deficit malnutrition (Albumin <3)  Plan  -NPO for now until stabilized    Renal/Fluids/Electrolytes:   1. MARCELA  2. Hpokalemia  3. Volume status-4L NS given on floor  Plan  - electrolyte replacement  - monitor function and electrolytes as needed with replacement per ICU protocols. - generally avoid nephrotoxic agents such as NSAID, IV contrast unless specifically required  - adjust  medications as needed for renal clearance  - follow I/O's as appropriate.    Infectious Disease:   1. Bactermia (2/2 bottles + GPC in chains and pairs)  2. urospesis  3. sepsis, source control  Plan  - Zosyn    Endocrine:   1. S/p thyroidectomy  2. Stress induced hyperglycemia    Plan  - ICU insulin protocol, goal sugar <180  -Levothyroxine    Hematology/Oncology:   1. Hgb 7.8 (down from 9.4)  2.  Anemia, no signs, symptoms of active blood loss  Plan  - continue to monitor     IV/Access:   1. Venous access - TLC right groin  2. Arterial access - right groin  3.  Plan  - central access required and necessary      ICU Prophylaxis:   1. DVT: Hep Subq/ LMWH/mechanical  2. VAP: HOB 30 degrees, chlorhexidine rinse  3. Stress Ulcer: PPI/H2 blocker  4. Restraints: Nonviolent soft two point restraints required and necessary for patient safety and continued cares and good effect as patient continues to pull at necessary lines, tubes despite education and distraction. Will readdress daily.   5. Wound care  -   6. Feeding - NPO  7. Family Update:NP updated family  8. Disposition - gaurded        Rice goals for next 24 hours:   1.Antibiotics for bacteremia  2.pressors                 Medical History:     No past medical history on file.  No past surgical history on file.  Social History     Socioeconomic History    Marital status:      Spouse name: Not on file    Number of children: Not on file    Years of education: Not on file    Highest education level: Not on file   Occupational History    Not on file   Tobacco Use    Smoking status: Not on file    Smokeless tobacco: Not on file   Substance and Sexual Activity    Alcohol use: Not on file    Drug use: Not on file    Sexual activity: Not on file   Other Topics Concern    Not on file   Social History Narrative    Not on file     Social Determinants of Health     Financial Resource Strain: Not on file   Food Insecurity: Not on file   Transportation Needs: Not on file    Physical Activity: Not on file   Stress: Not on file   Social Connections: Not on file   Interpersonal Safety: Not on file   Housing Stability: Not on file        Allergies   Allergen Reactions    Morphine Nausea and Vomiting    Hydromorphone               Key Medications:      abiraterone  1,000 mg Oral QAM    apixaban ANTICOAGULANT  5 mg Oral BID    [Held by provider] DULoxetine  30 mg Oral BID    insulin aspart  1-6 Units Subcutaneous Q4H    [Held by provider] isosorbide mononitrate  30 mg Oral Daily    [Held by provider] mirtazapine  7.5 mg Oral At Bedtime    piperacillin-tazobactam  4.5 g Intravenous Q6H    potassium chloride  20 mEq Intravenous Q1H    [Held by provider] pregabalin  50 mg Oral BID    rosuvastatin  20 mg Oral QAM    senna-docusate  1 tablet Oral BID    Or    senna-docusate  2 tablet Oral BID    sodium chloride (PF)  3 mL Intracatheter Q8H    sodium chloride 0.9%  500 mL Intravenous Once    vancomycin  1,500 mg Intravenous At Bedtime      norepinephrine 0.03 mcg/kg/min (12/04/23 6425)    - MEDICATION INSTRUCTIONS -      - MEDICATION INSTRUCTIONS -      sodium chloride 100 mL/hr at 12/04/23 0405        Home Meds  No current facility-administered medications on file prior to encounter.  abiraterone (ZYTIGA) 250 MG tablet, Take 1,000 mg by mouth every morning  acetaminophen (TYLENOL) 325 MG tablet, Take 650 mg by mouth 2 times daily  apixaban ANTICOAGULANT (ELIQUIS) 5 MG tablet, Take 5 mg by mouth 2 times daily  cholecalciferol (VITAMIN D3) 25 mcg (1000 units) capsule, Take 1 capsule by mouth 2 times daily  DULoxetine (CYMBALTA) 30 MG capsule, Take 30 mg by mouth 2 times daily  isosorbide mononitrate (IMDUR) 30 MG 24 hr tablet, Take 30 mg by mouth daily  melatonin 3 MG tablet, Take 9 mg by mouth nightly as needed for sleep  metFORMIN (GLUCOPHAGE XR) 500 MG 24 hr tablet, Take 1,000 mg by mouth daily (with dinner)  metoprolol succinate ER (TOPROL XL) 100 MG 24 hr tablet, Take 100 mg by mouth 2 times  daily  mirtazapine (REMERON) 7.5 MG tablet, Take 7.5 mg by mouth at bedtime  oxyBUTYnin (DITROPAN) 5 MG tablet, Take 10 mg by mouth 2 times daily  predniSONE (DELTASONE) 5 MG tablet, Take 5 mg by mouth 2 times daily  pregabalin (LYRICA) 50 MG capsule, Take 50 mg by mouth 2 times daily  rosuvastatin (CRESTOR) 20 MG tablet, Take 20 mg by mouth every morning               Physical Examination:   Temp:  [98.6  F (37  C)-103  F (39.4  C)] 99.1  F (37.3  C)  Pulse:  [] 84  Resp:  [11-37] 25  BP: ()/() 115/58  MAP:  [0 mmHg-64 mmHg] 64 mmHg  Arterial Line BP: (1-96)/(-16-51) 96/48  SpO2:  [79 %-100 %] 95 %    Intake/Output Summary (Last 24 hours) at 12/4/2023 0629  Last data filed at 12/3/2023 1922  Gross per 24 hour   Intake 1100 ml   Output --   Net 1100 ml     Wt Readings from Last 4 Encounters:   12/03/23 81.6 kg (180 lb)   11/29/23 81.6 kg (180 lb)     Arterial Line BP: (1-96)/(-16-51) 96/48  MAP:  [0 mmHg-64 mmHg] 64 mmHg  BP - Mean:  [] 76  Resp: 25    Recent Labs   Lab 12/04/23  0441 12/03/23  2235   PH  --  7.40   PCO2  --  32*   PO2  --  79*   HCO3  --  20*   O2PER 5 36       GEN: no acute distress   HEENT: head ncat, sclera anicteric, OP patent, trachea midline   PULM: unlabored synchronous with vent, clear anteriorly    CV/COR: RRR S1S2 no gallop,  No rub, no murmur  ABD: soft nontender, hypoactive bowel sounds, no mass  EXT:  BLE mottled with palpable pulses  NEURO: grossly intact  SKIN: petechaie. Sloughing of skin UE  LINES: clean, dry intact         Data:   All data and imaging reviewed     ROUTINE ICU LABS (Last four results)  CMP  Recent Labs   Lab 12/04/23  0441 12/04/23  0058 12/03/23  2226 12/03/23  1502 11/30/23  0808 11/29/23  1341     --   --  140 141 140   POTASSIUM 2.8*  --   --  3.9 3.1* 4.2   CHLORIDE 112*  --   --  104 109* 105   CO2 17*  --   --  24 24 24   ANIONGAP 14  --   --  12 8 11   * 102* 128* 183* 91 102*   BUN 26.6*  --   --  26.3* 20.6 25.9*   CR  "1.38*  --   --  1.04 0.83 0.81   GFRESTIMATED 53*  --   --  74 90 >90   ADIS 8.5*  --   --  9.8 8.6* 9.0   MAG  --   --   --  1.9  --   --    PROTTOTAL 5.0*  --   --  6.4 5.7*  --    ALBUMIN 2.5*  --   --  3.3* 3.2*  --    BILITOTAL 0.4  --   --  0.5 0.4  --    ALKPHOS 64  --   --  89 83  --    AST 21  --   --  20 25  --    ALT 22  --   --  29 39  --      CBC  Recent Labs   Lab 12/04/23  0441 12/03/23  1502 11/30/23  0808 11/29/23  1728 11/29/23  1341   WBC 9.2 12.8* 9.5  --  10.5   RBC 2.47* 3.05* 3.07*  --  2.97*   HGB 7.8* 9.4* 9.6* 9.8* 9.3*   HCT 25.7* 30.6* 30.6*  --  29.9*   * 100 100  --  101*   MCH 31.6 30.8 31.3  --  31.3   MCHC 30.4* 30.7* 31.4*  --  31.1*   RDW 14.7 14.6 14.5  --  14.3   * 188 186  --  186     INRNo lab results found in last 7 days.  Arterial Blood Gas  Recent Labs   Lab 12/04/23  0441 12/03/23  2235   PH  --  7.40   PCO2  --  32*   PO2  --  79*   HCO3  --  20*   O2PER 5 36       All cultures:  No results for input(s): \"CULT\" in the last 168 hours.  Recent Results (from the past 24 hour(s))   Chest XR,  PA & LAT    Narrative    EXAM: XR CHEST 2 VIEWS  LOCATION: [facility]  DATE: 12/3/2023    INDICATION: Low blood pressure.  COMPARISON: None.      Impression    IMPRESSION:    Lung volumes are low, with associated hypoventilatory changes. No focal airspace opacities, pleural effusions, or pneumothorax.    Enlarged cardiomediastinal silhouette, likely accentuated by the low lung volumes. Left subclavian approach dual lead pacemaker with lead tips over the right atrium and right ventricle.   CT Abdomen Pelvis w Contrast    Narrative    EXAM: CT ABDOMEN PELVIS W CONTRAST  LOCATION: [facility]  DATE: 12/3/2023    INDICATION: low bp  mottled  COMPARISON: 11/29/2023  TECHNIQUE: CT scan of the abdomen and pelvis was performed following injection of IV contrast. Multiplanar reformats were obtained. Dose reduction techniques were " used.  CONTRAST: 91mL Isovue 370    FINDINGS:   LOWER CHEST: No acute findings    HEPATOBILIARY: Unremarkable    PANCREAS: Unremarkable    SPLEEN: Scattered calcified granulomas    ADRENAL GLANDS: Unremarkable    KIDNEYS/BLADDER: Redemonstrated renal cysts. No hydronephrosis.    BOWEL: No obstruction or inflammatory change. Prior gastric fundoplication.    LYMPH NODES: Normal.    VASCULATURE: The portal, superior mesenteric, and splenic veins are patent. The abdominal aorta is normal in caliber.    PELVIC ORGANS: Fiducial markers in the prostate.    MUSCULOSKELETAL: Unremarkable      Impression    IMPRESSION:   1.  No acute findings in the abdomen or pelvis.         Billing: This patient is critically ill: Yes. Total critical care time today 30 min.

## 2023-12-05 PROBLEM — N17.0 ACUTE KIDNEY FAILURE WITH TUBULAR NECROSIS (H): Status: ACTIVE | Noted: 2023-01-01

## 2023-12-05 NOTE — PROGRESS NOTES
Intensivist note  I had a long talk with both son's at bedside, 2 conversations. Patients condition has deteriorated significantly overnight with worsening renal failure and liver failure, and shock.     At this point both son's are certain that he would want comfort care only. I have started a narcotic infusion as he appeared uncomfortable earlier. We are waiting for a  to visit before proceeding to comfort.     Comfort care orders are completed.     I spent an additional 30 minutes of critical care time assessing multiple organ failure and clinical deterioration and discussing end of life/comfort care measures with family.    Candie larson  December 5, 2023

## 2023-12-05 NOTE — PROGRESS NOTES
I was contacted to evaluate the patient due to generalized pain.  Patient was restless, he was alert and following commands level, he was on the BiPAP.  I reviewed his labs, he noted that his  Bicarb on the ABG is 9, pH 7.21, and PCO2 23.  The patient was off vasopressin, on 0.03 norepinephrine, and his MAP is in the 80s.    His PCO2 indicates appropriate respiratory compensation for his metabolic acidosis.  I ordered    The patient has been anuric, I am worried about acute kidney injury, so I ordered set of lab including renal panel, liver enzymes, lactic acid, I also ordered EKG and troponin.    Renal panel showed worsening acute kidney injury with creatinine 2.21 compared to 1.59 yesterday, bicarbonate decreased significantly to 7 compared to 16 yesterday, and his potassium also increased to 6 compared to 5.6 at 4 PM yesterday and 3.8 yesterday morning.  His anion gap is 23, delta anion gap is 13, and delta bicarbonate is 17, delta/delta is 0.9 indicating complex high anion gap metabolic acidosis and normal anion gap metabolic acidosis, likely secondary to lactic acidosis and acute kidney injury.  Also noticeably has AST and ALT significantly increased to the thousands compared to yesterday indicating shock liver.  His ionized calcium is 4.3 minimally decreased.  His EKG showed wide-complexpaced rhythm, unchanged compared to before, and high-sensitivity troponin increased from 202 to 261.    Overall the patient clinical picture is concerning for significant deterioration with worsening acute kidney injury complicated by severe  metabolic acidosis and hyperkalemia.    Plan  - hyperkalemia management, ordered insulin and D50, calcium gluconate, and an amp of sodium bicarb  -I ordered NG tube placement so this can be used for Lokelma administration, unfortunately this was unsuccessful after multiple attempts by bedside Rns  -Start the patient on sodium bicarbonate drip 150 cc/h  -I ordered Lasix challenge with 1 nasir  per kilogram bolus, we will monitor in the next 2 hours after administration of Lasix, if no meaningful urine output, this indicates high risk for requiring renal replacement therapy  -If the above interventions are not successful with correcting acidosis and hyperkalemia, I we will consult nephrology for CRRT, but before initiating CRRT.      Critical Care Time: 40 min.  I spent this time (excluding procedures) personally providing and directing critical care services at the bedside and on the critical care unit.     Chart documentation was completed, in part, with [employer] voice-recognition software. Even though reviewed, some grammatical, spelling, and word errors may remain.      María Elena Macias MD   Pulmonary and Critical Care

## 2023-12-05 NOTE — CARE PLAN
Pt transitioned to comfort cares earlier today. Fentanyl drip ran at 125/hour. Pt passed away at 1350. Death packet completed awaiting transfer to [facility].

## 2023-12-05 NOTE — PROGRESS NOTES
Death note  Patient pronounced dead at 1350pm; family at bedside.    Candie larson  December 5, 2023

## 2023-12-05 NOTE — PROGRESS NOTES
Pt remains on Bipap overnight for increased work of breathing. Pt gets agitated and restless at times. Orally Suctioned thick bloody secretion after unsuccessful NGT placement.MD and RN aware of bloody secretions. BS diminished. Will continue to follow. Suction PRN and wean as able.    Conor Frazier, RT

## 2023-12-05 NOTE — CONSULTS
"SPIRITUAL HEALTH SERVICES  SPIRITUAL ASSESSMENT Consult Note  [facility] ICU     REFERRAL SOURCE: Paged for End of Life family support    Visited with Shaan's sons, Braxton and Ruy, who shared stories about their father's \"giving nature\" and how he supported his family and cared for their mother at the end of her life. 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     I offered spiritual and emotional support through reflective listening that affirmed emotions, experience, and meaning. Offered end of life ritual through scripture reading and prayer which incorporated conversational themes.    PLAN: Spiritual Health remains available for support.    Michell Mcgowan  Associate     [facility] routine referrals *[phone]     [facility] available 24/7 for emergent requests/referrals, either by paging the on-call  or by entering an ASAP/STAT consult in Epic (this will also page the on-call ).     "

## 2023-12-05 NOTE — PHARMACY-VANCOMYCIN DOSING SERVICE
Pharmacy Vancomycin Note  Date of Service 2023  Patient's  1945   78 year old, male    Indication: Sepsis  Day of Therapy: 2  Current vancomycin regimen:  2000 mg IV x1 12/4 am, 1500mg q24h  Current vancomycin monitoring method: AUC  Current vancomycin therapeutic monitoring goal: 400-600 mg*h/L    InsightRX Prediction of Current Vancomycin Regimen  Regimen: 1500 mg IV every 24 hours.  Start time: 21:31 on 2023  Exposure target: AUC24 (range)400-600 mg/L.hr   AUC24,ss: 986 mg/L.hr  Probability of AUC24 > 400: 100 %  Ctrough,ss: 30.1 mg/L  Probability of Ctrough,ss > 20: 95 %  Probability of nephrotoxicity (Lodise ALLA ): 42 %      Current estimated CrCl = Estimated Creatinine Clearance: 44.9 mL/min (A) (based on SCr of 1.59 mg/dL (H)).    Creatinine for last 3 days  12/3/2023:  3:02 PM Creatinine 1.04 mg/dL  2023:  4:41 AM Creatinine 1.38 mg/dL;  8:19 AM Creatinine 1.59 mg/dL    Recent Vancomycin Levels (past 3 days)  2023:  8:18 PM Vancomycin 25.7 ug/mL    Vancomycin IV Administrations (past 72 hours)                     vancomycin (VANCOCIN) 2,000 mg in 0.9% NaCl 500 mL intermittent infusion (mg) 2,000 mg New Bag 23 0102                    Nephrotoxins and other renal medications (From now, onward)      Start     Dose/Rate Route Frequency Ordered Stop    23 1000  vasopressin 0.2 units/mL in NS (PITRESSIN) standard conc infusion         2.4 Units/hr  12 mL/hr  Intravenous CONTINUOUS 23 0934      23 0839  vancomycin place nuñez - receiving intermittent dosing         1 each Intravenous SEE ADMIN INSTRUCTIONS 23 0839      23 0500  norepinephrine (LEVOPHED) 4 mg in  mL infusion PREMIX         0.01-0.6 mcg/kg/min × 81.6 kg  3.1-183.6 mL/hr  Intravenous CONTINUOUS 23 0451      23 2300  piperacillin-tazobactam (ZOSYN) 4.5 g vial to attach to  mL bag        Note to Pharmacy: For [facility], [facility] and [facility]: For Zosyn-naive patients,  "use the \"Zosyn initial dose + extended infusion\" order panel.    4.5 g  over 30 Minutes Intravenous EVERY 6 HOURS 12/03/23 2059                 Contrast Orders - past 72 hours (72h ago, onward)      Start     Dose/Rate Route Frequency Stop    12/04/23 1130  perflutren diluted 1mL to 2mL with saline (OPTISON) diluted injection 9 mL        Note to Pharmacy: NDC: 4940-8561-67    9 mL Intravenous ONCE 12/04/23 1123    12/03/23 1710  iopamidol (ISOVUE-370) solution 91 mL         91 mL Intravenous ONCE 12/03/23 1712            Interpretation of levels and current regimen:  Vancomycin level is reflective of AUC greater than 600  Has serum creatinine changed greater than 50% in last 72 hours: Yes  Urine output:  diminished urine output  Renal Function: Worsening    Plan:  No Vancomycin dose tonight.    Vancomycin monitoring method: AUC  Vancomycin therapeutic monitoring goal: 400-600 mg*h/L  Pharmacy will check vancomycin levels as appropriate in  am labs .  Serum creatinine levels will be ordered daily for the first week of therapy and at least twice weekly for subsequent weeks.    Milan Ledesma RPH    "

## 2023-12-05 NOTE — PROGRESS NOTES
BiPAP now on standby after large volume of bloody emesis. Blood clots suctioned out of patient's mouth    Federico Palacios, RRT on 12/5/2023 at 7:32 AM

## 2023-12-05 NOTE — PROGRESS NOTES
Patient did not respond to the 80 mg of IV Lasix, still anuric, his follow-up labs after the potassium shifting treatment showed potassium going down to 5.2, bicarbonate went up marginally from 7 to 9 after the amp of sodium bicarb and sodium bicarb drip.  Still acidotic but improved with pH 7.25 compared to 7.21.  I do think that the patient has an indication for renal replacement therapy with CRRT given the acidosis that is not corrected with sodium bicarb replacement.  I discussed with son Ruy over the phone, and I updated him about the clinical deterioration overnight, I talked to him about the indication for CRRT, Ruy shared with me that the patient would not want any heroic measures including hemodialysis.  In this case I recommended transitioning to comfort care approach, Ruy would like to come and visit his dad at the bedside and discuss further with the care team.  Patient also has a hemoglobin drop from 7.8 to 6.5, no apparent  signs for active bleeding except for epistaxis after the unsuccessful attempts of placing the NG tube.  Although blood transfusion is indicated, given the fact that the patient would not want CRRT per his son and given the possibility that the patient would transition to comfort care this morning, I think the best approach is to hold off blood transfusion until Ruy arrives and discuss further.        Critical Care Time: 30 min.  I spent this time (excluding procedures) personally providing and directing critical care services at the bedside and on the critical care unit.     María Elena Macias MD   Pulmonary and Critical Care

## 2023-12-05 NOTE — PROGRESS NOTES
Received Nursing Admission Screen on 12/3/23:  Have you recently lost weight without trying? - Yes, 2-13#  Have you eaten poorly because of a decreased appetite? - Yes     Noted patient has changed to comfort care status, therefore full assessment will not be completed at this time.  Will be available if more aggressive nutrition intervention desired.  Jackelyn Alvarez, RD, LD, [facility]   Clinical Dietitian - [facility]

## 2023-12-05 NOTE — PLAN OF CARE
Problem: Risk for Delirium  Goal: Optimal Coping  Outcome: Progressing  Intervention: Optimize Psychosocial Adjustment to Delirium  Recent Flowsheet Documentation  Taken 12/4/2023 1600 by Michael Man RN  Supportive Measures: active listening utilized  Family/Support System Care:   caregiver stress acknowledged   family care conference arranged  Taken 12/4/2023 1200 by Michael Man RN  Supportive Measures: active listening utilized  Family/Support System Care:   caregiver stress acknowledged   family care conference arranged  Taken 12/4/2023 0800 by Michael Man RN  Supportive Measures: active listening utilized  Family/Support System Care:   caregiver stress acknowledged   family care conference arranged   Goal Outcome Evaluation:    Alert and oriented x4 in that he can answer all questions. Neuros intact, though weak. Tele 100% V paced. LS dim on 10L did try BiPAP for a couple hours but did not like it. BM today. Pang with minimal output. MD aware and is okay with this today. Levo Currently at 0.9 per hour and vaso started at 2.4 per hour. Glucose well controled. Steroids started today. Plan to continue to monitor tonight.

## 2023-12-05 NOTE — PROGRESS NOTES
Referral received, thank you. CURLY Holley is currently at [facility] and heading to patient's room to evaluate. ETA is around 30 minutes.

## 2023-12-05 NOTE — DISCHARGE SUMMARY
"[facility]  Hospitalist Discharge Summary      Date of Admission:  11/29/2023  Date of Discharge:  11/30/2023  4:08 PM  Discharging Provider: Maria Alejandra Lainez MD  Discharge Service: Hospitalist Service    Discharge Diagnoses   Generalized weakness  Constipation  Microscopic hematuria  Chronic anemia  pAF on Eliquis status post ablation and pacemaker    Clinically Significant Risk Factors     # DMII: A1C = 6.6 % (Ref range: <5.7 %) within past 6 months  # Overweight: Estimated body mass index is 25.83 kg/m  as calculated from the following:    Height as of this encounter: 1.778 m (5' 10\").    Weight as of this encounter: 81.6 kg (180 lb).       Follow-ups Needed After Discharge   Follow-up Appointments     Follow Up and recommended labs and tests      Follow up with [facility] physician.  The following labs/tests are   recommended: cbc and repeat ua (gross hematuria follow up).            Discharge Disposition   Discharged to home  Condition at discharge: Stable    Hospital Course   Shaan was admitted with generalized weakness and constipation, described as increasing fatigue and weakness for the past week.  Constipation is been ongoing over several months with intermittent blood in stool, has on previous imaging a noted 1.4 cm right colonic polyp versus stool ball and colonoscopy has  been recommended, recommended be completed as an outpatient.  Recommended bowel regimen with success in ED, recommend he continue this as outpatient.  Hemoglobin has been stable, recommend this be repeated at follow-up.  As he already has PT and OT at his facility, agree with the decision to discharge to facility.    Consultations This Hospital Stay   CARE MANAGEMENT / SOCIAL WORK IP CONSULT  PHYSICAL THERAPY ADULT IP CONSULT    Code Status   No CPR- Do NOT Intubate    Time Spent on this Encounter   I, Maria Alejandra Lainez MD, personally saw the patient today and spent greater than 30 minutes discharging this " patient.       Maria Alejandra Lainez MD  [facility] EMERGENCY DEPT  [address]  [facility] [address]  Phone: [phone]  Fax: [phone]  ______________________________________________________________________    Physical Exam   Vital Signs:                    Weight: 180 lbs 0 oz         Primary Care Physician   Varun Butt    Discharge Orders      Home Care Referral      Follow Up and recommended labs and tests    Follow up with [facility] physician.  The following labs/tests are recommended: cbc and repeat ua (gross hematuria follow up).     Reason for your hospital stay    You were seen for weakness.  Recommend continuing PT/OT at facility, following up with PCP at facility to repeat hemoglobin to make sure stable. Recommend outpatient colonoscopy.     Activity - Up ad sendy     Diet    Follow this diet upon discharge: Orders Placed This Encounter      Regular Diet Adult       Significant Results and Procedures   Most Recent 3 CBC's:  Recent Labs   Lab Test 12/04/23  0819 12/04/23  0441 12/03/23  1502   WBC 12.0* 9.2 12.8*   HGB 7.8* 7.8* 9.4*    104* 100    147* 188     Most Recent 3 BMP's:  Recent Labs   Lab Test 12/04/23  1609 12/04/23  1604 12/04/23  1209 12/04/23  0822 12/04/23  0819 12/04/23  0441 12/03/23  2226 12/03/23  1502   NA  --   --   --   --  144 143  --  140   POTASSIUM  --  5.6*  --   --  3.8 2.8*  --  3.9   CHLORIDE  --   --   --   --  115* 112*  --  104   CO2  --   --   --   --  16* 17*  --  24   BUN  --   --   --   --  30.0* 26.6*  --  26.3*   CR  --   --   --   --  1.59* 1.38*  --  1.04   ANIONGAP  --   --   --   --  13 14  --  12   ADIS  --   --   --   --  7.9* 8.5*  --  9.8   *  --  111* 106* 112* 103*   < > 183*    < > = values in this interval not displayed.   ,   Results for orders placed or performed during the hospital encounter of 11/29/23   XR Chest 2 Views    Narrative    XR CHEST 2 VIEWS 11/29/2023 3:48 PM    HISTORY: Difficulty w/ deep  breathing    COMPARISON: None.      Impression    IMPRESSION: Pulmonary vascular congestion. No infiltrate, pleural  effusion or pneumothorax. Mild cardiomegaly. Tortuous aortic arch.  Pacemaker.    ANA CARRILLO MD         SYSTEM ID:  [id number]   CT Abdomen Pelvis w Contrast    Narrative    CT ABDOMEN PELVIS W CONTRAST 11/29/2023 3:29 PM    CLINICAL HISTORY: LLQ pain, hx of nodule in sigmoid colon    TECHNIQUE: CT scan of the abdomen and pelvis was performed following  injection of IV contrast. Multiplanar reformats were obtained. Dose  reduction techniques were used.  CONTRAST: 83mL Isovue-370    COMPARISON: None.    FINDINGS:   LOWER CHEST: Scattered centrilobular emphysema and subsegmental  atelectasis. Left lower lobe calcified granuloma. Pacer leads.    HEPATOBILIARY: Calcified granulomas.    PANCREAS: Fatty replaced pancreas.    SPLEEN: Splenic granulomas.    ADRENAL GLANDS: Normal.    KIDNEYS/BLADDER: Low-density lesions in the kidneys, measuring up to  9.6 cm on the right. In the right renal pelvis is a nonobstructive  calculus measuring 3 mm. No hydronephrosis. Urinary bladder  unremarkable.    BOWEL: Probable fundoplication. No small bowel obstruction. Large  amount of formed stool in the colon with scattered diverticula. In the  ascending colon, there is a rounded mildly hyperdense nodule measuring  1.4 cm series 3, image 149. See also coronal image 48. This may be  attached to the wall.    PELVIC ORGANS: Probable atrophic prostate with fiducial markers.    ADDITIONAL FINDINGS: No abdominopelvic lymphadenopathy. No ascites. No  free air. Fat-containing left inguinal hernia. No abdominal aortic  aneurysm.    MUSCULOSKELETAL: Negative bones. Mild right gynecomastia.      Impression    IMPRESSION:   1.  By CT, no acute findings in the visualized abdomen or pelvis.    2.  Findings consistent with constipation in the correct clinical  setting.    3.  Possible 1.4 cm right colon polyp versus stool ball. This  can be  correlated with nonemergent optical colonoscopy, if not recently  performed.    4.  Probable renal cysts, measuring up to 9.6 cm on the right. These  can be confirmed with nonemergent renal ultrasound.    MICHAEL PITTMAN MD         SYSTEM ID:  [id number]       Discharge Medications   Discharge Medication List as of 11/30/2023  3:40 PM        CONTINUE these medications which have NOT CHANGED    Details   abiraterone (ZYTIGA) 250 MG tablet Take 1,000 mg by mouth every morning, Historical      acetaminophen (TYLENOL) 325 MG tablet Take 650 mg by mouth 2 times daily, Historical      apixaban ANTICOAGULANT (ELIQUIS) 5 MG tablet Take 5 mg by mouth 2 times daily, Historical      cholecalciferol (VITAMIN D3) 25 mcg (1000 units) capsule Take 1 capsule by mouth 2 times daily, Historical      DULoxetine (CYMBALTA) 30 MG capsule Take 30 mg by mouth 2 times daily, Historical      isosorbide mononitrate (IMDUR) 30 MG 24 hr tablet Take 30 mg by mouth daily, Historical      melatonin 3 MG tablet Take 9 mg by mouth nightly as needed for sleep, Historical      metFORMIN (GLUCOPHAGE XR) 500 MG 24 hr tablet Take 1,000 mg by mouth daily (with dinner), Historical      metoprolol succinate ER (TOPROL XL) 100 MG 24 hr tablet Take 100 mg by mouth 2 times daily, Historical      mirtazapine (REMERON) 7.5 MG tablet Take 7.5 mg by mouth at bedtime, Historical      oxyBUTYnin (DITROPAN) 5 MG tablet Take 10 mg by mouth 2 times daily, Historical      predniSONE (DELTASONE) 5 MG tablet Take 5 mg by mouth 2 times daily, Historical      pregabalin (LYRICA) 50 MG capsule Take 50 mg by mouth 2 times daily, Historical      rosuvastatin (CRESTOR) 20 MG tablet Take 20 mg by mouth every morning, Historical           Allergies   Allergies   Allergen Reactions    Morphine Nausea and Vomiting    Hydromorphone

## 2023-12-05 NOTE — CONSULTS
[facility]    Consult Note - [facility]  _________________________________________________________________    [facility] 24/7 Contact Number: [phone]    - Providers: Please contact [facility] with changes in orders or clinical plan of care   - Nursing: Please contact [facility] with significant changes in patient condition    Hospice will notify the care team (including the hospitalist) to confirm date of inpatient hospice (GIP) admission.    New Epic encounter will not be created until hospice completes admission.   ______________________________________________________________________        Hospice Diagnosis: Sepsis, UTI renal failure, liver failure    Indication for Inpatient Hospice: dyspnea, pain, agitaiton    Goals for Hospital Discharge: not stable for discharge, will continue to collaborate with social work on discharge planning    Plan of Care Discussed with the Following:   - Nurse: CURLY Rodriguez  - Hospitalist/Rounding Provider: Sarita Melo  - Shaan's Family/Preferred Contact: Ruy abarca  - Hospice Provider: Dr. Navas    Summary of Visit (includes assessment, medications and any new orders):   Met with patient and family, patients RR 28, otherwise does not appear in pain. Educated patient and family on hospice cares. Consents signed and all questions answered to family's satisfaction.     New orders:    Morphine 3mg Q2hrs IV PRN for RR > 20    Leydi Verderame

## 2023-12-06 LAB
ATRIAL RATE - MUSE: 70 BPM
BACTERIA BLD CULT: ABNORMAL
BACTERIA UR CULT: ABNORMAL
BACTERIA UR CULT: ABNORMAL
DIASTOLIC BLOOD PRESSURE - MUSE: NORMAL MMHG
INTERPRETATION ECG - MUSE: NORMAL
P AXIS - MUSE: NORMAL DEGREES
PR INTERVAL - MUSE: 244 MS
QRS DURATION - MUSE: 84 MS
QT - MUSE: 468 MS
QTC - MUSE: 505 MS
R AXIS - MUSE: -30 DEGREES
SYSTOLIC BLOOD PRESSURE - MUSE: NORMAL MMHG
T AXIS - MUSE: 79 DEGREES
VENTRICULAR RATE- MUSE: 70 BPM

## 2023-12-09 LAB
BACTERIA BLD CULT: NO GROWTH
BACTERIA BLD CULT: NO GROWTH

## 2023-12-15 NOTE — DISCHARGE SUMMARY
Discharge/death summary    Date of admission: December 3, 2023  Date of discharge: 2023    List of diagnoses  UTI  and septic shock (hypotension) due to #2  Streptococcus pyogenes bacteremia likely due to UTI  Acute renal failure due to #1  Acute respiratory failure due to #1  Metabolic encephalopathy due to sepsis #1  Atrial fibrillation   S/p AV greyson ablation and permanent pacemaker placement  Chronic anticoagulation   MDD and anxiety   History of prostate cancer   History of thyroid cancer  History of prior subarachnoid hemorrhage   Coronary artery disease   DM      Summary of hospitalization  He was admitted with sepsis syndrome due to UTI, and was DNR/DNI from beginning. His course over the 2 days was one of general deterioration with worsening multiple organ failure. I spoke with sons and they felt he would want comfort measures. He was transitioned to comfort care and  soon after.     Candie larson  2023